# Patient Record
Sex: MALE | Race: WHITE
[De-identification: names, ages, dates, MRNs, and addresses within clinical notes are randomized per-mention and may not be internally consistent; named-entity substitution may affect disease eponyms.]

---

## 2021-01-13 ENCOUNTER — HOSPITAL ENCOUNTER (INPATIENT)
Dept: HOSPITAL 75 - IRF | Age: 51
LOS: 14 days | Discharge: HOME | DRG: 91 | End: 2021-01-27
Attending: INTERNAL MEDICINE | Admitting: INTERNAL MEDICINE
Payer: COMMERCIAL

## 2021-01-13 VITALS — SYSTOLIC BLOOD PRESSURE: 126 MMHG | DIASTOLIC BLOOD PRESSURE: 84 MMHG

## 2021-01-13 VITALS — SYSTOLIC BLOOD PRESSURE: 128 MMHG | DIASTOLIC BLOOD PRESSURE: 83 MMHG

## 2021-01-13 VITALS — BODY MASS INDEX: 25.86 KG/M2 | WEIGHT: 195.11 LBS | HEIGHT: 73.03 IN

## 2021-01-13 DIAGNOSIS — M41.9: ICD-10-CM

## 2021-01-13 DIAGNOSIS — J18.9: ICD-10-CM

## 2021-01-13 DIAGNOSIS — J40: ICD-10-CM

## 2021-01-13 DIAGNOSIS — Z79.52: ICD-10-CM

## 2021-01-13 DIAGNOSIS — G72.81: Primary | ICD-10-CM

## 2021-01-13 DIAGNOSIS — D64.9: ICD-10-CM

## 2021-01-13 DIAGNOSIS — B94.8: ICD-10-CM

## 2021-01-13 PROCEDURE — 82805 BLOOD GASES W/O2 SATURATION: CPT

## 2021-01-13 PROCEDURE — 36600 WITHDRAWAL OF ARTERIAL BLOOD: CPT

## 2021-01-13 PROCEDURE — 36415 COLL VENOUS BLD VENIPUNCTURE: CPT

## 2021-01-13 PROCEDURE — 83605 ASSAY OF LACTIC ACID: CPT

## 2021-01-13 PROCEDURE — 94640 AIRWAY INHALATION TREATMENT: CPT

## 2021-01-13 PROCEDURE — 71275 CT ANGIOGRAPHY CHEST: CPT

## 2021-01-13 PROCEDURE — 71046 X-RAY EXAM CHEST 2 VIEWS: CPT

## 2021-01-13 PROCEDURE — 84145 PROCALCITONIN (PCT): CPT

## 2021-01-13 PROCEDURE — 85025 COMPLETE CBC W/AUTO DIFF WBC: CPT

## 2021-01-13 PROCEDURE — 94760 N-INVAS EAR/PLS OXIMETRY 1: CPT

## 2021-01-13 PROCEDURE — 94664 DEMO&/EVAL PT USE INHALER: CPT

## 2021-01-13 PROCEDURE — 80053 COMPREHEN METABOLIC PANEL: CPT

## 2021-01-13 RX ADMIN — DOCUSATE SODIUM AND SENNOSIDES SCH EA: 8.6; 5 TABLET, FILM COATED ORAL at 20:36

## 2021-01-13 RX ADMIN — METOPROLOL TARTRATE SCH MG: 50 TABLET, FILM COATED ORAL at 20:33

## 2021-01-13 RX ADMIN — POLYETHYLENE GLYCOL (3350) SCH GM: 17 POWDER, FOR SOLUTION ORAL at 20:36

## 2021-01-13 RX ADMIN — DOCUSATE SODIUM SCH MG: 100 CAPSULE ORAL at 20:36

## 2021-01-13 NOTE — PM&R POST ADMISSION ASSESSMENT
PM&R HP


Date of Visit:  Jan 13, 2021


Time of Visit:  12:00


History of Present Illness


CC: Debility following Covid pneumonia





HPI: This is a 51yo  male clinic Pt of Dr. Grayson Hull who 

suffered from Covid-19 pneumonia 11/12 with acute hypoxic, hypercapneic, 

respiratory failure secondary to ARDS and pneumonia. He was placed on a 

ventilator, trach was removed, has a history of HTN, acute kidney injury, 

hemorrhage of trach stoma, hypernatremia, anemia and leukocytosis. His prior 

level of functioning was independent, working full time at the dog food 

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 He is 

currently dependent in most activities due to desaturation and severe debility. 

His plan is to go home with his significant other girlfriend.





David Platt is a previously healthy 50yoBM admitted to [facility] rehab on 1/13/21 

from [facility] and prior to that from [facility] for 

respiratory failure due to COVID-19. Pt was diagnosed with COVID-19 on 11/12/20 

and was treating himself at home when he got progressively fatigued and short of

breath which prompted him to visit the ED at [facility] on 11/23/20 

where his respiratory status worsened with O2 sats 57% on 5 L NC.  In the ED, he

was placed on 100% non-rebreather, given Levophed for hypotension, and sent to 

ICU where his breathing continued to decline.  In [facility] ICU, treatment escalated

from BiPAP with no improvement to intubation and placement on mechanical 

ventilation with sedation by Propofol and Precedex.  Pt unable to be weaned from

vent so trach and PEG placed.  To treat COVID pt received convalescent plasma, 

Remdesivir, a course of Decadron.  He also received a 10-day course of 

Vancomycin and Zosyn.  On 12/18/20 pt transferred to [facility] 

from [facility] to continue management and weaning of mechanical ventilation.  At 

[facility] patient remained on the vent and sedated for over a week and suffered a 

bleed from his tracheostomy stoma requiring transfusion of 1 unit PRBC and 

epinephrine to control.  He developed PNA due to Proteus mirabilis which was 

treated with 7 days of Zosyn.  He began weaning off sedation on 12/26/21 and 

graduated to CPAP by 12/29/21 with continued improvement culminating with trach 

capped and toleration of room air only by 1/06/21.  Patient presents today at 

[facility] with significant decline in functional ability and will require intensive 

rehabilitation to return home.


OMAR ALEJO STUDENT





Patient was admitted to the ARU during this COVID-19 emergency. The ARU is the 

best and most appropriate post-acute care setting for this patient at this 

current time. Patient meets IRF admission criteria, however will be unable to 

tolerate 3 hours of therapy/5 days per week. Provide specific plan, such as: 

This patients individualized intensive rehabilitation plan is to receive 2 

hours of therapy per day, by receiving 1 hour of PT and 1 hour of OT 5 out of 7 

days per the patients week.  As an interdisciplinary team, we will discuss this

patients ability to tolerate an increase in the intensity of therapy to be 

provided throughout the patients stay.





Past Medical-Social-Family Hx


Past Med/Social Hx:  Reviewed Nursing Past Med/Soc Hx, Reviewed and Corrections 

made


Patient Social History


Marrital Status:  cohabiting


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


Alcohol Use:  Denies Use


Smoking Status:  Never a Smoker


Recent Foreign Travel:  No


Contact w/other who traveled:  No





Past Medical History


Respiratory:  Pneumonia


COVID-19 PNA





PM&R Allergy/Meds/Data Review


Allergies


Coded Allergies:  


     No Known Drug Allergies (Unverified , 1/13/21)





Home Medications


Scheduled


Enoxaparin Sodium (Enoxaparin Sodium), 40 MG SQ DAILY, (Reported)


Famotidine (Famotidine), 20 MG PO DAILY, (Reported)


Metoprolol Tartrate (Metoprolol Tartrate), 100 MG PO BID, (Reported)


Prednisone (Prednisone), 20 MG PO DAILY, (Reported)





Scheduled PRN


Ipratropium/Albuterol Sulfate (Iprat-Albut 0.5-3(2.5) mg/3 ml), 3 ML IH Q6H PRN 

for SHORTNESS OF BREATH, (Reported)





Current Medications


Current Medications


Reviewed





Review of Systems


Constitutional:  see HPI, malaise, weakness


EENTM:  no symptoms reported


Respiratory:  cough, dyspnea on exertion, short of breath, wheezing


Cardiovascular:  no symptoms reported


Gastrointestinal:  no symptoms reported


Genitourinary:  no symptoms reported


Musculoskeletal:  back pain


Skin:  no symptoms reported


Psychiatric/Neurological:  No Symptoms Reported





Physical Exam


Physical Exam


Vital Signs





Vital Signs - First Documented








 1/13/21





 10:43


 


Temp 36.6


 


Pulse 98


 


Resp 18


 


B/P (MAP) 128/83 (98)


 


Pulse Ox 92


 


O2 Delivery Room Air





Capillary Refill :


Height, Weight, BMI


Height: '"


Weight: lbs. oz. kg;  BMI


Method:


General Appearance:  No Apparent Distress, WD/WN, Anxious, Thin


Eyes:  Bilateral Eye Normal Inspection, Bilateral Eye PERRL


HEENT:  PERRL/EOMI, Normal ENT Inspection, Pharynx Normal


Neck:  Full Range of Motion, Normal Inspection, Non Tender, Supple, Carotid 

Bruit


Respiratory:  Chest Non Tender, Lungs Clear, No Accessory Muscle Use, No 

Respiratory Distress, Decreased Breath Sounds


Cardiovascular:  Regular Rate, Rhythm, No Edema, No Gallop, No JVD, No Murmur, 

Normal Peripheral Pulses


Gastrointestinal:  Normal Bowel Sounds, No Organomegaly, No Pulsatile Mass, Non 

Tender, Soft


Back:  Normal Inspection, No CVA Tenderness, No Vertebral Tenderness


Extremity:  Normal Capillary Refill, Normal Inspection, Normal Range of Motion, 

Non Tender, No Calf Tenderness, No Pedal Edema


Neurologic/Psychiatric:  Alert, Oriented x3, No Motor/Sensory Deficits, Abnormal

Gait, Depressed Affect, Motor Weakness (generalized all extremities)


Skin:  Normal Color, Warm/Dry


Lymphatic:  No Adenopathy





PM&R Medical Assessment & Plan


REHAB/MEDICAL ASSESSMENT AND PLAN:





REHAB IMPAIRMENT GROUP: 


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





ETIOLOGIC DIAGNOSIS: 


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





The comorbidities that impact the patients function and/or functional outcome 

by:frail status, poor reserve, catastrophic lung dysfunction





REHAB PLAN:


The patient is being admitted to our comprehensive inpatient rehabilitation 

facility and can tolerate the intensity of service consisting of at least:


      180 minutes of therapy a day, 5 out of 7 days a week 


    


Rehab treatment will consist of:  PT OT will focus on regaining strength and 

ambulatory skills along with increase in ADL independence in order to return to 

live independently





The patient/family has a good understanding of our discharge process and will 

benefit from an interdisciplinary inpatient rehabilitation program. The patient 

has potential to make improvement and is in need of at least two of the 

following multidisciplinary therapies including but not limited to physical, 

occupational, speech, and prosthetics and orthotics.  Additionally the patient 

will need services from respiratory, nutritional services, wound care, 

psychology, etc. (Customize this to each patient). Given the patients complex 

condition and risk of further medical complications, rehabilitation services 

cannot be safely or effectively provided at a lower level of care such as a 

skilled nursing facility.





BARRIERS TO DISCHARGE:


Severe myopathy from COVID-19





ESTIMATED LOS:


10 days





DISPOSITION:


Home





RELEVANT CHANGES SINCE PREADMISSION SCREENING: 


I have compared the patients medical and functional status at the time of the 

preadmission screening and there are: 


      no changes 





PROGNOSIS:


Good





REHABILITATION GOALS:  


1. PT OT will focus on regaining strength and ambulatory skills along with 

increase in ADL independence in order to return to live independently








All the above goals were reviewed with the patient and he/she is in agreement.


By signing this document, I acknowledge that I have personally performed a full 

physical examination on this patient within 24 hours of admission to this 

inpatient rehabilitation facility and have determined the patient to be able to 

tolerate the above course of treatment at an intensive level for a reasonable 

period of time. I will be completing a detailed individualized Plan of Care for 

this patient by day #4 of the patients stay based upon the Preadmission Screen,

the Post-Admission Evaluation, and the therapy evaluations.


Admission Dx/Comorbidities:  


(1) Myopathy


ICD Codes:  G72.9 - Myopathy, unspecified


(2) History of tracheostomy


ICD Codes:  Z98.890 - Other specified postprocedural states


(3) Frailty


ICD Codes:  R54 - Age-related physical debility


(4) Cough


ICD Codes:  R05 - Cough


(5) Anemia


ICD Codes:  D64.9 - Anemia, unspecified


(6) COVID-19


ICD Codes:  U07.1 - COVID-19





Assessment/Plan


Assessment and Plan


Assess & Plan/Chief Complaint


Assessment:


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


Anemia


s/p trach now DC


Steroid maintenance





Plan:


IRF protocol


Increase ADL independence


Ambulate


O2 monitoring











THOR MATHEWS DO                Jan 13, 2021 11:06

## 2021-01-13 NOTE — PROGRESS NOTE
OMAR ALEJO MED STUDENT 1/13/21 1155:


Progress Note


David Platt is a previously healthy 50yoBM admitted to [facility] rehab on 1/13/21 

from [facility] and prior to that from [facility] for 

respiratory failure due to COVID-19. Pt was diagnosed with COVID-19 on 11/12/20 

and was treating himself at home when he got progressively fatigued and short of

breath which prompted him to visit the ED at [facility] on 11/23/20 

where his respiratory status worsened with O2 sats 57% on 5 L NC.  In the ED, he

was placed on 100% non-rebreather, given Levophed for hypotension, and sent to 

ICU where his breathing continued to decline.  In [facility] ICU, treatment escalated

from BiPAP with no improvement to intubation and placement on mechanical 

ventilation with sedation by Propofol and Precedex.  Pt unable to be weaned from

vent so trach and PEG placed.  To treat COVID pt received convalescent plasma, 

Remdesivir, a course of Decadron.  He also received a 10-day course of 

Vancomysin and Zosyn.  On 12/18/20 pt transferred to [facility] 

from [facility] to continue management and weaning of mechanical ventilation.  At 

[facility] patient remained on the vent and sedated for over a week and suffered a 

bleed from his tracheostomy stoma requiring transfusion of 1 unit PRBC and 

epinephrine to control.  He developed PNA due to Proteus mirabilis which was 

treated with 7 days of Zosyn.  He began weaning off sedation on 12/26/21 and 

graduated to CPAP by 12/29/21 with continued improvement culminating with trach 

capped and toleration of room air only by 1/06/21.  Patient presents today at 

[facility] with significant decline in functional ability and will require intensive 

rehabilitation to return home.





NEDRA MATHEWS DO 1/14/21 0607:


Supervisory-Addendum Brief


Verification & Attestation


Participated in pt care:  history, MDM, physical


Personally performed:  exam, history, MDM, supervision of care


Care discussed with:  Medical Student


Procedures:  n/a


Results interpretation:  Verified all documentation


Verification and Attestation of Medical Student E/M Service





A medical student performed and documented this service in my presence. I 

reviewed and verified all information documented by the medical student and made

modifications to such information, when appropriate. I personally performed the 

physical exam and medical decision making. 





 Nedra Mathews, Jan 14, 2021,06:07











OMAR ALEJO MED STUDENT  Jan 13, 2021 11:55


NEDRA MATHEWS DO                Jan 14, 2021 06:07

## 2021-01-13 NOTE — OCCUPATIONAL THERAPY EVAL
OT Evaluation-General/PLF


Medical Diagnosis


Admission Date


2021 at 10:19


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


Onset Date:  2020





Therapy Diagnosis


Therapy Diagnosis:  Weakness, Decreased ADL skills





Precautions


Precautions/Isolations:  Fall Prevention, Standard Precautions, Pressure Ulcer





Weight Bear Status


Weight Bearing Restriction:  Weight Bearing/Tolerated





Referral


Physician:  Marlon Tobias Reason:  Activity Tolerance, Self Care, Evaluation/Treatment, 

Strengthening/ROM





Medical History


Pertinent Medical History:  HTN


Additional Medical History


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


Current History


Pt. admitted on 20 with respiratory failure.  Diagnosed with Covid on 

20.  Intubated and trach/PEG tube placed.


Reviewed History:  Yes





Social History


Home:  Single Level


Current Living Status:  Significant Other


Entry Into Home:  Stairs Without Railing


 Steps Into Home:  3





ADL-Prior Level of Function


SCALE: Activities may be completed with or without assistive devices.





6-Indepedent-patient completes the activity by him/herself with no assistance 

from a helper.


5-Set-up or Clean-up Assistance-helper sets up or cleans up; patient completes 

activity. [place] assists only prior to or  


    following the activity.


4-Supervision or Touching Assistance-helper provides verbal cues and/or touching

/steadying and/or contact guard assistance as patient completes activity. 

Assistance may be provided   


    throughout the activity or intermittently.


3-Partial/Moderate Assistance-helper does LESS THAN HALF the effort. [place] 

lifts, holds or supports trunk or limbs, but provides less than half the effort.


2-Substantial/Maximal Assistance-helper does MORE THAN HALF the effort. [place] 

lifts or holds trunk or limbs and provides more than half the effort.


9-Bbkvesouo-czjhka does ALL the effort. Patient does none of the effort to 

complete the activity. Or, the assistance of 2 or more helpers is required for 

the patient to complete the  


    activity.


If activity was not attempted, code reason:


7-Patient Refused.


9-Not Applicable-not attempted and the patient did not perform the activity 

before the current illness, exacerbation or injury.


10-Not Attempted due to Environmental Limitations-(lack of equipment, weather 

restraints, etc.).


88-Not Attempted due to Medical Conditions or Safety Concerns.


ADL PLOF Comments


Pt. was independent with all daily skills.  He does not use an assistive device,

and works full time.  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


Self Care:  Independent


Functional Cognition:  Independent


DME/Equipment:  Shower


Drive Self:  Yes





OT Current Status


Subjective


No pain reported.  Pt. does report that he feels very fatigued.





Mental Status/Objective


Patient Orientation:  Person, Place, Time, Situation


Attachments:  PEG Tube





Current


Hand Dominance:  Right


Upper Extremity ROM


Pt. has limited shoulder AROM.  He is able to flex bilateral shoulders to 

approximately 30 degrees.  WFL in all other joints.


Upper Extremity Strength


2+/5 bilateral elbows flexion, approximately 3/5 bilateral hands.


Pt. verbalizes that he has tremors in bilateral hands.  He states that he had 

them mildly before becoming sick, but they are more pronounced now after being 

ill.





ADL-Treatment


Eating (QC):  4 (Pt. issued weighted utensils to assist with tremors when 

feeding self.)


Oral Hygiene (QC):  7


Shower/Bathe Self (QC):  88


Upper Body Dressing (QC):  2 (Pt. reports that today was the first day he has 

had street clothing on.  Nursing at previous location donned shirt for him.)


Lower Body Dressing (QC):  2


On/Off Footwear (QC):  2


Toileting Hygiene (QC):  2 (Pt. reports that he requires max assist for 

toileting tasks.)





Other Treatments


Pt. admitted this date via EMS.  Pt. seen for co-treatment by PT/OT due to need 

of skilled assistance x 2 for weakness, severe debility, and skill acquisition. 

OT assessed ADL skills and UE strength while PT focused on LE strengthening and 

transfers/mobility.  Pt. requires mod assist for supine-sit, and min x 2 for 

sit-stand.  Pt. had on street clothing, and reports that the nursing staff 

assisted him prior to discharge from acute facility.  Pt. verbalizes that he 

just started to feed self several days ago, as he required assistance due to 

weakness and tremors.  OT issues pt. hand therapy sponge for continued 

strengthening, as well as heavy utensils.  Pt. requests diet sprite.  OT obtains

this for him, and pt. is unable to open the bottle on his own.  Pt. able to sit 

on side of bed with appropriate posture, but has difficulty keeping head upright

due to weakness.  Pt. is able to stand several more times at walker with min 

assistance x 2.  He is unable to stand for very long, (Please see PT note).  Pt.

transfers sit-supine with mod assistance.  All needs are met at bed level.





Education


OT Patient Education:  Correct positioning, Exercise program, Modified ADL 

techniques, Progress toward Goal/Update tx plan, Purpose of tx/functional 

activities, Reviewed precautions, Rehab process, Transfer techniques


Teaching Recipient:  Patient


Teaching Methods:  Demonstration, Discussion


Response to Teaching:  Verbalize Understanding, Return Demonstration





OT Short Term Goals


Short Term Goals


Time Frame:  2021


Eatin


Oral hygiene:  5


Toileting hygiene:  4


Shower/bathe self:  4


Upper body dressin


Lower body dressin


Putting on/taking off footwear:  4





OT Long Term Goals


Long Term Goals


Time Frame:  Feb 10, 2021


Eating (QC):  6


Oral Hygiene (QC):  6


Toileting Hygiene (QC):  6


Shower/Bathe Self (QC):  4


Upper Body Dressing (QC):  6


Lower Body Dressing (QC):  6


On/Off Footwear (QC):  6


Additional Goals:  1-Demonstrate ADL Tasks, 2-Verbalize Understanding, 3-

ImproveStrength/[place]


1=Demonstrate adherence to instructed precautions during ADL tasks.


2=Patient will verbalize/demonstrate understanding of assistive 

devices/modifications for ADL.


3=Patient will improve strength/tolerance for activity to enable patient to 

perform ADL's.





OT Education/Plan


Problem List/Assessment


Assessment:  Decreased Activ Tolerance, Decreased UE Strength, Dependent 

Transfers, Impaired Bed Mobility, Impaired Funct Balance, Impaired I ADL's, 

Impaired Self-Care Skills, Restricted Funct UE ROM





Discharge Recommendations


Plan/Recommendations:  Continue POC


Therapy Discharge Recommendati:  Home & Family, Post Acute OT





Treatment Plan/Plan of Care


Treatment,Training & Education:  Yes


Patient would benefit from OT for education, treatment and training to promote 

independence in ADL's, mobility, safety and/or upper extremity function for 

ADL's.


Plan of Care:  ADL Retraining, Caregiver Training, Concurrent Therapy, Fun

ctional Mobility, Group Exercise/Act as Ind, UE Funct Exercise/Act, UE Neuromus 

Re-Ed/Coord


Treatment Duration:  2021


Frequency:  Modified Program (IRF)


Estimated Hrs Per Day:  1 hour per day


Agreement:  Yes


Rehab Potential:  Good


Due to a COVID-19 viral infection, the patient has deficits that warrant 

inpatient Acute Rehab.  The patient will clearly benefit from intensive PT and 

OT, however, due to patient's observed endurance and therapy considerations, he 

may not be able to tolerate the full 3 hours of scheduled therapy.  Therefore, 

he will be scheduled for as much therapy as he can tolerate with intentional 

rest breaks, shortened sessions, including providing therapy across 6 to 7 days.

 As the patient tolerates, the intensity, frequency, and duration of his therapy

program will be increased.





Time/GCodes


Start Time:  10:20


Stop Time:  11:15


Total Time Billed (hr/min):  45


Billed Treatment Time


9457-7199 PT eval, no charge


[id number] 1, EVH x 10minutes


[id number] FA x 35minutes.  Co-treatment with PT.  Please see above note for 

designated roles.











DILLON MANCUSO OT           2021 15:46

## 2021-01-13 NOTE — NUR
MANJIT ROBERTSON admitted to room 233, with an admitting diagnosis of CRITICAL ILLNESS MYOPATHY 
S/P COVID-19, on 1/13/20 from [facility] via EMS, accompanied by EMS STAFF. 
MANJIT ROBERTSON introduced to surroundings, call light, bed controls, phone, TV, temperature 
control, lights, meal times, smoking policy, visitor policy, side rail policy, bathrooms and 
showers. Patient Rights given to patient in the handbook. MANJIT ROBERTSON verbalizes 
understanding that [facility] is not responsible for the loss or damage to any personal 
effects or valuables that are kept in the patient's possession during their hospitalization. 
The following Patient Care Plans were discussed with the PATIENT: Discharge Planning, 
IMPAIRED MOBILITY, HIGH RISK: IMPAIRED SKIN INTEGRITY, HIGH RISK: INJURY, and KNOWLEDGE 
DEFICIT. MANJIT ROBERTSON verbalizes understanding of Interdisciplinary Patient Education. 
Patient received Patient Rights Booklet, which includes Privacy Act Statement and Data 
Collection Information Summary.

## 2021-01-13 NOTE — NUR
MED REC WAS ENTERED USING THE DISCHARGE ORDER FROM [facility]

-------------------------------------------------------------------------------

Addendum: 01/14/21 at 1340 by OLI GORE CP

-------------------------------------------------------------------------------

SPOKE WITH THE PT TO COMPLETE THE MED REC



ACCORDING TO THE PT HE DOES NOT TAKE ANY PRESCRIPTION OR OTC MEDS

## 2021-01-13 NOTE — PHYSICAL THERAPY DAILY NOTE
PT Daily Note-Current


Subjective


Pt laying Supine in bed after finishing with OT upon arrival.  Pt agrees to PT.





Pain





   Location Body Site:  Abdomen


   Pain Description:  Ache





Mental Status


Patient Orientation:  Person, Place, Situation


Attachments:  PEG Tube





Transfers


SCALE: Activities may be completed with or without assistive devices.





6-Indepedent-patient completes the activity by him/herself with no assistance 

from a helper.


5-Set-up or Clean-up Assistance-helper sets up or cleans up; patient completes 

activity. [place] assists only prior to or  


    following the activity.


4-Supervision or Touching Assistance-helper provides verbal cues and/or 

touching/steadying and/or contact guard assistance as patient completes 

activity. Assistance may be provided   


    throughout the activity or intermittently.


3-Partial/Moderate Assistance-helper does LESS THAN HALF the effort. [place] 

lifts, holds or supports trunk or limbs, but provides less than half the effort.


2-Substantial/Maximal Assistance-helper does MORE THAN HALF the effort. [place] 

lifts or holds trunk or limbs and provides more than half the effort.


9-Mrozeiurz-lvobka does ALL the effort. Patient does none of the effort to 

complete the activity. Or, the assistance of 2 or more helpers is required for 

the patient to complete the  


    activity.


If activity was not attempted, code reason:


7-Patient Refused.


9-Not Applicable-not attempted and the patient did not perform the activity 

before the current illness, exacerbation or injury.


10-Not Attempted due to Environmental Limitations-(lack of equipment, weather 

restraints, etc.).


88-Not Attempted due to Medical Conditions or Safety Concerns.





Weight Bearing


Right Lower Extremity:  Right


Full Weight Bearing


Left Lower Extremity:  Left


Full Weight Bearing





Exercises


Supine Ex:  Ankle pumps, Quad Set, Glut sets, Heel Slides, Straight leg raise, 

Hip abd/add


Supine Reps:  15





Treatments


Pt completes Supine Ex with RB as needed.  Pt resting Supine in bed as ST a

rrives.  All needs met, call light in hand.





Assessment


Current Status:  Good Progress


Pt is motivated and continues to push self throughout tx.





PT Short Term Goals


Short Term Goals


Time Frame:  2021


Roll Left & Right:  6


Sit to lyin


Lying to sitting on side of be:  6


Sit to stand:  6


Chair/bed-to-chair transfer:  6





PT Long Term Goals


Long Term Goals


PT Long Term Goals Time Frame:  Feb 3, 2021


Roll Left & Right (QC):  6


Sit to Lying (QC):  6


Lying-Sitting on Side/Bed(QC):  6


Sit to Stand (QC):  6


Chair/Bed-to-Chair Xfer(QC):  6


Toilet Transfer (QC):  6


Car Transfer (QC):  5


Does the Patient Walk:  No and Walking Goal IS indicated


Walk 10 feet (QC):  6


Walk 50ft with 2 Turns (QC):  6


Walk 150 ft (QC):  6


Walking 10ft on Uneven Surface:  6


1 Step (curb) (QC):  6


4 Steps (QC):  5


12 Steps (QC):  5


Picking up an Object (QC):  6


Does the Pt use WC or Scooter?:  No


Wheel 50 feet with 2 turns (QC:  9


Type:  N/A


Wheel 150 feet:  9


Type:  N/A





PT Plan


Problem List


Problem List:  Activity Tolerance, Functional Strength





Treatment/Plan


Treatment Plan:  Continue Plan of Care


Treatment Plan:  Bed Mobility, Education, Functional Activity [place], Functional 

Strength, Group Therapy, Gait, Safety, Therapeutic Exercise, Transfers


Treatment Duration:  Feb 3, 2021


Frequency:  Modified Program (IRF)


Estimated Hrs Per Day:  Other


Patient and/or Family Agrees t:  Yes





Safety Risks/Education


Patient Education:  Correct Positioning, Safety Issues


Teaching Recipient:  Patient


Teaching Methods:  Discussion


Response to Teaching:  Verbalize Understanding





Time/GCodes


Time In:  1420


Time Out:  1450


Total Billed Treatment Time:  30


Total Billed Treatment


1, EX x2 (30m)











YAEL GALVAN PTA              2021 15:38

## 2021-01-13 NOTE — ST COGNITIVE LINGUISTIC EVAL
Speech Evaluation-General


Medical Diagnosis


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


Onset Date:  Nov 12, 2020





Therapy Diagnosis


Therapy Diagnosis:  Cognitive-communication





Medical History


Pertinent Medical History:  HTN


Reviewed History:  Yes





Social History


Current Living Status:  Significant Other





Speech PLF-Current Status


Prior Level of Function





Patient lived in the home with his significant other. Patient was working


full time prior to his illness.





Language Eval: Auditory


Comprehends Simple Yes/No Ques:  Functional


Indent/Objects Multiple Fields:  Functional


Ident/Pics in Multiple Fields:  Functional


Follows 1-Step Commands:  Functional


Follows Complex Directions:  Functional


Follows General Conversations:  Functional





Language Eval: Verbal Language


Completes Spontaneous Greeting:  Functional


Produces Auto, Serial Info:  Functional


Imitates Simple Words/Phrases:  Functional


Word Finding:  Functional


Requests Basic Needs:  Functional


States Basic Personal Info:  Functional


Expresses Complex Ideas:  Functional





Objective Cognitive Domain


Attention:  WNL


Memory:  WNL


Problem Solving:  Functional


Executive Functions:  WNL


Visuospatial Skills:  WNL


Composite Severity Rating:  WNL


Clock Drawing Severity Rating:  WNL





Objective


Formal/Standardized Tests


[facility] Mental Status ([facility])


Results


28/30, within normal limits of function


Oral Motor/Speech Production


Within Normal Limits


Impression


Patient is a 51 y/o male who was admitted to the ARU s/p COVID. Patient was 

given the SLUMS at bedside with a score of 28/30 obtained. This score is within 

normal limits and does not warrant further ST services.





Speech Patient Assess


Expression of Ideas/Wants:  Expression (4)


Understanding Verbal Content:  Understands (4)


Brief Interview-Mental Status:  Yes


Repetition of Three Words:  Three (3)


Temporal Orientation: Year:  Correct (3)


Temporal Orientation: Month:  Accurate within 5 days(2)


Temporal Orientation: Day:  Correct (1)


Recall : Wear to say "Sock":  Yes, no cue required (2)


Recall : Color:  Yes, no cue required (2)


Recall : Bed:  Yes,after cueing (1)


Memory/Recall Ability:  Current season, That he or she is in a hsp/hsp unit





Speech-Plan


Patient/Family Goals


Patient/Family Goals:  


Patient plans on returning to his home upon discharge from ARU.





Treatment Plan


Speech Therapy Treatment Plan:  Discontinue ST


Treatment Duration:  Jan 13, 2021


Frequency:  1 time per week


Estimated Hrs Per Day:  .25 hour per day


Rehab Potential:  Good


Barriers to Learning:  


None identified


Pt/Family Agrees to Plan:  Yes





Safety Risks/Education


Teaching Recipient:  Patient


Teaching Methods:  Discussion


Response to Teaching:  Verbalize Understanding


Education Topics Provided:  


Safety within his room, communication of wants/needs





Time


Speech Therapy Time In:  14:50


Speech Therapy Time Out:  15:05


Total Billed Time:  15


Billed Treatment Time


1, SPSNDCOMP


ANGELICA Rios            Jan 13, 2021 14:59

## 2021-01-13 NOTE — PHYSICAL THERAPY EVALUATION
PT Evaluation-General


Medical Diagnosis


Admission Date


2021 at 10:19


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


Onset Date:  2020





Therapy Diagnosis


Therapy Diagnosis:  debility





Precautions


Precautions/Isolations:  Fall Prevention, Standard Precautions, Pressure Ulcer





Weight Bear Status


Right Lower Extremity:  Right


Full Weight Bearing


Left Lower Extremity:  Left


Full Weight Bearing





Referral


Physician:  Marlon


Reason for Referral:  Evaluation/Treatment





Medical History


Pertinent Medical History:  HTN


Current History


Pt was diagnosed with Covid 19 on 20. He presented to the hospital for 

admission on 20 due to respiratory failure. Sats were noted to be 57% on 

5L in the ER. Eventually intubated and placed on ventilator. Initially unable to

be weaned from vent, had trach and PEG tube placement. Pt has since weaned to 

RA, transferred to ARU this date from [facility].


Reviewed History:  Yes





Social History


Home:  Single Level


Current Living Status:  Significant Other


Entry Into Home:  Stairs Without Railing


PT Steps Into Home:  3





Prior


Prior Level of Function


SCALE: Activities may be completed with or without assistive devices.





6-Indepedent-patient completes the activity by him/herself with no assistance 

from a helper.


5-Set-up or Clean-up Assistance-helper sets up or cleans up; patient completes 

activity. [place] assists only prior to or  


    following the activity.


4-Supervision or Touching Assistance-helper provides verbal cues and/or 

touching/steadying and/or contact guard assistance as patient completes activit

y. Assistance may be provided   


    throughout the activity or intermittently.


3-Partial/Moderate Assistance-helper does LESS THAN HALF the effort. [place] 

lifts, holds or supports trunk or limbs, but provides less than half the effort.


2-Substantial/Maximal Assistance-helper does MORE THAN HALF the effort. [place] 

lifts or holds trunk or limbs and provides more than half the effort.


3-[place]-[id number] does ALL the effort. Patient does none of the effort to 

complete the activity. Or, the assistance of 2 or more helpers is required for 

the patient to complete the  


    activity.


If activity was not attempted, code reason:


7-Patient Refused.


9-Not Applicable-not attempted and the patient did not perform the activity 

before the current illness, exacerbation or injury.


10-Not Attempted due to Environmental Limitations-(lack of equipment, weather 

restraints, etc.).


88-Not Attempted due to Medical Conditions or Safety Concerns.


Bed Mobility:  6


Transfers (B,C,W/C):  6


Gait:  6


Stairs:  6


Wheelchair Mobility:  9


Indoor Mobility (Ambulation):  Independent


Stairs:  Independent


Prior Devices Use:  None


Working full time prior to illness





PT Evaluation-Current


Subjective


Pt presented from LTACH via EMS. Denies pain but reports fatigue.





Pain





   Numeric Pain Scale:  0-No Pain





Pt/Family Goals


Home





Objective


Patient Orientation:  Person, Place, Time, Situation





ROM/Strength


ROM Upper Extremities


See OT


ROM Lower Extremities


Grossly WFL for functional mobility


Strength Upper Extremities


See OT


Strength Lower Extremities


(B) DF grossly 3+/5, PF 2/5


(B) knee flexion and extension: 3-/5


(B) hips grossly: 3-/5





Integumentary/Posture


Integumentary


See nurses' notes


Posture


Pt very weak, sits EOB with posterior pelvic tilt, flexed posture. Difficulty 

maintaining upright posture over time, frequently hanging his head.





Neuromuscular


(Tone, Coordination, Reflexes)


Tremors with exertion in (B) UE and LE





Sensory


Vision:  Functional


Hearing:  Functional


Hand Dominance:  Right


Sensation Right Lower Extremit:  Intact


Sensation Left Lower Extremity:  Intact





Transfers


Roll Left & Right (QC):  6


Sit to Lying (QC):  3


Lying to Sitting/Side of Bed(Q:  3


Sit to Stand (QC):  1


Chair/Bed-to-Chair Xfer(QC):  88


Toilet Transfer (QC):  88


Car Transfer (QC):  88


Pt sat EOB x 30' with VCS for posture. Good sitting balance but demonstrates 

postural fatigue. Pt only able to maintain standing at FWW for roughly 15" 

before needing to sit due to LE fatigue.





Gait


Does the Patient Walk?:  No and Walking Goal IS indicated


Mode of Locomotion:  Walk


Anticipated Mode of Locomotion:  Walk


Walk 10 feet (QC):  88


Walk 50 ft with 2 Turns(QC):  88


Walk 150 ft (QC):  88


Walking 10ft/uneven surface-QC:  88


Gait Assistive Device:  FWW


Comments/Gait Description


Pt only able to stand roughly 15" before returning to sit due to poor functional

activity tolerance.





Wheelchair Training


Does the Pt Use a Wheelchair?:  No


Wheel 50 ft with 2 turns (QC):  9


Wheel 150 ft (QC):  9


Type of Wheelchair:  N/A





Stairs


1 Step (curb) (QC):  88


4 Steps (QC):  88


12 Steps (QC):  88


Pt with poor functional activity tolerance; only able to tolerate static 

standing for roughly 15" this date.





Balance


Sitting Static:  Good


Sitting Dynamic:  Good


Standing Static:  Fair


 Standing Dynamic:  Fair


Picking up an Object (QC):  88





Treatment


Eval. Sitting EOB with VCS for posture, reviewed supine postural exercises. At 

EOB with OT at end of treatment.





Assessment/Needs


Pt is a 50 y.o. male with debility s/p Covid 19 hospitalization. Pt would 

benefit from skilled PT to improve functional activity tolerance, functional 

strength and (I) with functional mobility to allow safe return home with S.O. 

with decreased caregiver burden.


Rehab Potential:  Good





PT Short Term Goals


Short Term Goals


Time Frame:  2021


Roll Left & Right:  6


Sit to lyin


Lying to sitting on side of be:  6


Sit to stand:  6


Chair/bed-to-chair transfer:  6





PT Long Term Goals


Long Term Goals


PT Long Term Goals Time Frame:  Feb 3, 2021


Roll Left & Right (QC):  6


Sit to Lying (QC):  6


Lying-Sitting on Side/Bed(QC):  6


Sit to Stand (QC):  6


Chair/Bed-to-Chair Xfer(QC):  6


Toilet Transfer (QC):  6


Car Transfer (QC):  5


Does the Patient Walk:  No and Walking Goal IS indicated


Walk 10 feet (QC):  6


Walk 50ft with 2 Turns (QC):  6


Walk 150 ft (QC):  6


Walking 10ft on Uneven Surface:  6


1 Step (curb) (QC):  6


4 Steps (QC):  5


12 Steps (QC):  5


Picking up an Object (QC):  6


Does the Pt use WC or Scooter?:  No


Wheel 50 feet with 2 turns (QC:  9


Type:  N/A


Wheel 150 feet:  9


Type:  N/A


PT LTGs established to allow safe return home with S.O. with decreased caregiver

burden.





PT Plan


Problem List


Problem List:  Activity Tolerance, Functional Strength, Safety, Balance, Gait, 

Transfer, Bed Mobility





Treatment/Plan


Treatment Plan:  Continue Plan of Care


Treatment Plan:  Bed Mobility, Education, Functional Activity [place], Functional 

Strength, Group Therapy, Gait, Safety, Therapeutic Exercise, Transfers


Treatment Duration:  Feb 3, 2021


Frequency:  Modified Program (IRF)


Estimated Hrs Per Day:  Other


Patient and/or Family Agrees t:  Yes


Due to a COVID-19 viral infection, the patient has deficits that warrant inpatie

nt Acute Rehab. The patient will clearly benefit from intensive PT and OT, 

however, due to patient's observed endurance and therapy considerations, he may 

not be able to tolerate the full 3 hours of scheduled therapy. Therefore, he 

will be scheduled for as much therapy as he can tolerate with intentional rest 

breaks, shortened sessions, including providing therapy across 6 to 7 days. As 

the patient tolerates, the intensity, frequency, and duration of his therapy 

program will be increased.





Safety Risks/Education


Teaching Recipient:  Patient


Teaching Methods:  Discussion


Response to Teaching:  Verbalize Understanding


Role of PT, POC





Discharge Recommendations


Barriers to Progress


poor functional activity tolerance





Time/GCodes


Time In:  1020


Time Out:  1100


Total Billed Treatment Time:  30


Total Billed Treatment


1, EVHIGH x 10', FA x 20'


[id number] EVHIGH


[id number] OT eval


[id number] Co-treat with OT


Co-treat with OT due level of assist required and poor functional activity 

tolerance. PT addressing posture, LE's and transfers; OT addressing UE and ADLs.











SANDRA OSULLIVAN DPT              2021 14:33
Hyperbilirubinemia of prematurity
Hyperbilirubinemia of prematurity

## 2021-01-13 NOTE — OCCUPATIONAL THER DAILY NOTE
OT Current Status-Daily Note


Subjective


Pt AxO, does not rate pain. Pleasant throughout. Pt agrees to OT tx, though 

denies OOB.





Mental Status/Objective


Patient Orientation:  Person, Place, Situation





ADL-Treatment


Therapy Code Descriptions/Definitions 





Functional [place] Measure:


0=Not Assessed/NA        4=Minimal Assistance


1=Total Assistance        5=Supervision or Setup


2=Maximal Assistance  6=Modified [place]


3=Moderate Assistance 7=Complete IndependenceSCALE: Activities may be completed 

with or without assistive devices.





6-Indepedent-patient completes the activity by him/herself with no assistance 

from a helper.


5-Set-up or Clean-up Assistance-helper sets up or cleans up; patient completes 

activity. [place] assists only prior to or  


    following the activity.


4-Supervision or Touching Assistance-helper provides verbal cues and/or touch

ing/steadying and/or contact guard assistance as patient completes activity. 

Assistance may be provided   


    throughout the activity or intermittently.


3-Partial/Moderate Assistance-helper does LESS THAN HALF the effort. [place] 

lifts, holds or supports trunk or limbs, but provides less than half the effort.


2-Substantial/Maximal Assistance-helper does MORE THAN HALF the effort. [place] 

lifts or holds trunk or limbs and provides more than half the effort.


1-Ozckdvrhh-snjprq does ALL the effort. Patient does none of the effort to 

complete the activity. Or, the assistance of 2 or more helpers is required for 

the patient to complete the  


    activity.


If activity was not attempted, code reason:


7-Patient Refused.


9-Not Applicable-not attempted and the patient did not perform the activity 

before the current illness, exacerbation or injury.


10-Not Attempted due to Environmental Limitations-(lack of equipment, weather 

restraints, etc.).


88-Not Attempted due to Medical Conditions or Safety Concerns.





Other Treatment


Pt agrees to bed ex. HEP given and each activity demonstrated/ minimal cues for 

proper placement. Pt able to complete the following ex (10-15 reps) with 

resistance band with RUE: bicep curls, shoulder external/ internal rotation. Com

pletes the following against gravity with RUE: shoulder flexion to 90*. LUE 

completes the following against gravity with noted shaking during full motion: 

shoulder flexion with assist for reaching 90*, able to stabilize in this 

position 3-4 seconds, x 5 trials. Pt able to utilize back/ triceps to push LUE 

into bedding on way down. Pt completes the following with theraband LUE: bicep 

curls, shoulder internal/ external rotation. Skilled modifications of activity 

per pt tolerance. Pt's LUE ranged to full range in shoulder flexion and 

abduction. 


All needs met, call light in reach, pt left with PT end of session.





Education


OT Patient Education:  Exercise program, Home exercise program, Purpose of 

tx/functional activities, Safety issues


Teaching Recipient:  Patient


Teaching Methods:  Demonstration, Handout, Discussion


Response to Teaching:  Verbalize Understanding, Return Demonstration, 

Reinforcement Needed





OT Long Term Goals


Long Term Goals


Eating (QC):  6


Oral Hygiene (QC):  6


Toileting Hygiene (QC):  6


Shower/Bathe Self (QC):  6


Upper Body Dressing (QC):  6


Lower Body Dressing (QC):  6


On/Off Footwear (QC):  6


1=Demonstrate adherence to instructed precautions during ADL tasks.


2=Patient will verbalize/demonstrate understanding of assistive devices/mod

ifications for ADL.


3=Patient will improve strength/tolerance for activity to enable patient to 

perform ADL's.





OT Education/Plan


Problem List/Assessment


Assessment:  Decreased Activ Tolerance, Decreased UE Strength, Dependent 

Transfers, Edema, Impaired Bed Mobility, Impaired Funct Balance, Impaired I 

ADL's, Impaired Self-Care Skills, Restricted Funct UE ROM





Discharge Recommendations


Plan/Recommendations:  Continue POC


Therapy Discharge Recommendati:  Home & Family, Post Acute OT





Treatment Plan/Plan of Care


Treatment,Training & Education:  Yes


Patient would benefit from OT for education, treatment and training to promote 

independence in ADL's, mobility, safety and/or upper extremity function for 

ADL's.


Plan of Care:  ADL Retraining, Caregiver Training, Concurrent Therapy, 

Functional Mobility, Group Exercise/Act as Ind, UE Funct Exercise/Act, UE 

Neuromus Re-Ed/Coord


Treatment Duration:  Jan 27, 2021


Frequency:  At least 5 of 7 days/Wk (IRF)


Estimated Hrs Per Day:  1.5 hours per day


Agreement:  Yes


Rehab Potential:  Fair





Time/GCodes


Start Time:  13:55


Stop Time:  14:15


Total Time Billed (hr/min):  20


Billed Treatment Time


1, EX (20)











WILFRID FRAZIER OTPEDRITO                Jan 13, 2021 14:20

## 2021-01-14 VITALS — DIASTOLIC BLOOD PRESSURE: 71 MMHG | SYSTOLIC BLOOD PRESSURE: 119 MMHG

## 2021-01-14 VITALS — DIASTOLIC BLOOD PRESSURE: 80 MMHG | SYSTOLIC BLOOD PRESSURE: 120 MMHG

## 2021-01-14 VITALS — DIASTOLIC BLOOD PRESSURE: 83 MMHG | SYSTOLIC BLOOD PRESSURE: 127 MMHG

## 2021-01-14 LAB
ALBUMIN SERPL-MCNC: 3.2 GM/DL (ref 3.2–4.5)
ALP SERPL-CCNC: 97 U/L (ref 40–136)
ALT SERPL-CCNC: 79 U/L (ref 0–55)
BASOPHILS # BLD AUTO: 0.1 10^3/UL (ref 0–0.1)
BASOPHILS NFR BLD AUTO: 1 % (ref 0–10)
BILIRUB SERPL-MCNC: 0.4 MG/DL (ref 0.1–1)
BUN/CREAT SERPL: 13
CALCIUM SERPL-MCNC: 8.5 MG/DL (ref 8.5–10.1)
CHLORIDE SERPL-SCNC: 103 MMOL/L (ref 98–107)
CO2 SERPL-SCNC: 25 MMOL/L (ref 21–32)
CREAT SERPL-MCNC: 1 MG/DL (ref 0.6–1.3)
EOSINOPHIL # BLD AUTO: 0.5 10^3/UL (ref 0–0.3)
EOSINOPHIL NFR BLD AUTO: 5 % (ref 0–10)
GFR SERPLBLD BASED ON 1.73 SQ M-ARVRAT: > 60 ML/MIN
GLUCOSE SERPL-MCNC: 92 MG/DL (ref 70–105)
HCT VFR BLD CALC: 33 % (ref 40–54)
HGB BLD-MCNC: 10.3 G/DL (ref 13.3–17.7)
LYMPHOCYTES # BLD AUTO: 2.4 10^3/UL (ref 1–4)
LYMPHOCYTES NFR BLD AUTO: 26 % (ref 12–44)
MANUAL DIFFERENTIAL PERFORMED BLD QL: NO
MCH RBC QN AUTO: 30 PG (ref 25–34)
MCHC RBC AUTO-ENTMCNC: 31 G/DL (ref 32–36)
MCV RBC AUTO: 95 FL (ref 80–99)
MONOCYTES # BLD AUTO: 0.8 10^3/UL (ref 0–1)
MONOCYTES NFR BLD AUTO: 9 % (ref 0–12)
NEUTROPHILS # BLD AUTO: 5.4 10^3/UL (ref 1.8–7.8)
NEUTROPHILS NFR BLD AUTO: 59 % (ref 42–75)
PLATELET # BLD: 198 10^3/UL (ref 130–400)
PMV BLD AUTO: 10 FL (ref 9–12.2)
POTASSIUM SERPL-SCNC: 3.7 MMOL/L (ref 3.6–5)
PROT SERPL-MCNC: 6.1 GM/DL (ref 6.4–8.2)
SODIUM SERPL-SCNC: 138 MMOL/L (ref 135–145)
WBC # BLD AUTO: 9.2 10^3/UL (ref 4.3–11)

## 2021-01-14 RX ADMIN — ENOXAPARIN SODIUM SCH MG: 100 INJECTION SUBCUTANEOUS at 09:09

## 2021-01-14 RX ADMIN — DOCUSATE SODIUM SCH MG: 100 CAPSULE ORAL at 20:21

## 2021-01-14 RX ADMIN — METOPROLOL TARTRATE SCH MG: 50 TABLET, FILM COATED ORAL at 20:22

## 2021-01-14 RX ADMIN — DOCUSATE SODIUM SCH MG: 100 CAPSULE ORAL at 09:10

## 2021-01-14 RX ADMIN — POLYETHYLENE GLYCOL (3350) SCH GM: 17 POWDER, FOR SOLUTION ORAL at 20:22

## 2021-01-14 RX ADMIN — METOPROLOL TARTRATE SCH MG: 50 TABLET, FILM COATED ORAL at 09:07

## 2021-01-14 RX ADMIN — DOCUSATE SODIUM AND SENNOSIDES SCH EA: 8.6; 5 TABLET, FILM COATED ORAL at 09:10

## 2021-01-14 RX ADMIN — POLYETHYLENE GLYCOL (3350) SCH GM: 17 POWDER, FOR SOLUTION ORAL at 09:10

## 2021-01-14 RX ADMIN — DOCUSATE SODIUM AND SENNOSIDES SCH EA: 8.6; 5 TABLET, FILM COATED ORAL at 20:22

## 2021-01-14 RX ADMIN — FAMOTIDINE SCH MG: 20 TABLET, FILM COATED ORAL at 09:07

## 2021-01-14 NOTE — PM&R PROGRESS NOTE
Subjective


HPI/CC On Admission


Date Seen by Provider:  Jan 14, 2021


Time Seen by Provider:  10:30


Subjective/Events-last exam


1/14/21:


Wants PEG tube removed


History of placement 12/16/20


No issues


Up in chair today


Tachycardia noted and on BB


NO pain





Review of Systems


General:  Fatigue, Malaise


Neurological:  Weakness, Incoordination





Objective


Exam


Vital Signs





Vital Signs








  Date Time  Temp Pulse Resp B/P (MAP) Pulse Ox O2 Delivery O2 Flow Rate FiO2


 


1/14/21 21:36      Room Air  


 


1/14/21 20:22  105  120/80 (93)    


 


1/14/21 18:00 37.2  18  97   





Capillary Refill :


General Appearance:  No Apparent Distress, WD/WN, Anxious, Thin


HEENT:  PERRL/EOMI, Normal ENT Inspection, Pharynx Normal


Neck:  Full Range of Motion, Normal Inspection, Non Tender, Supple, Carotid 

Bruit


Respiratory:  Chest Non Tender, Lungs Clear, No Accessory Muscle Use, No 

Respiratory Distress, Decreased Breath Sounds


Cardiovascular:  Regular Rate, Rhythm, No Edema, No Gallop, No JVD, No Murmur, 

Normal Peripheral Pulses


Gastrointestinal:  Normal Bowel Sounds, No Organomegaly, No Pulsatile Mass, Non 

Tender, Soft


Back:  Normal Inspection, No CVA Tenderness, No Vertebral Tenderness


Extremity:  Normal Capillary Refill, Normal Inspection, Normal Range of Motion, 

Non Tender, No Calf Tenderness, No Pedal Edema


Neurologic/Psychiatric:  Alert, Oriented x3, No Motor/Sensory Deficits, Abnormal

Gait, Depressed Affect, Motor Weakness (generalized all extremities)


Skin:  Normal Color, Warm/Dry


Lymphatic:  No Adenopathy





Results/Procedures


Lab


Laboratory Tests


1/14/21 05:45








Patient resulted labs reviewed.





FIM


Transfers


Therapy Code Descriptions/Definitions 





Functional [place] Measure:


0=Not Assessed/NA        4=Minimal Assistance


1=Total Assistance        5=Supervision or Setup


2=Maximal Assistance  6=Modified [place]


3=Moderate Assistance 7=Complete IndependenceSCALE: Activities may be completed 

with or without assistive devices.





6-Indepedent-patient completes the activity by him/herself with no assistance 

from a helper.


5-Set-up or Clean-up Assistance-helper sets up or cleans up; patient completes 

activity. [place] assists only prior to or  


    following the activity.


4-Supervision or Touching Assistance-helper provides verbal cues and/or 

touching/steadying and/or contact guard assistance as patient completes 

activity. Assistance may be provided   


    throughout the activity or intermittently.


3-Partial/Moderate Assistance-helper does LESS THAN HALF the effort. [place] 

lifts, holds or supports trunk or limbs, but provides less than half the effort.


2-Substantial/Maximal Assistance-helper does MORE THAN HALF the effort. [place] 

lifts or holds trunk or limbs and provides more than half the effort.


9-Xrycwuglm-xahgab does ALL the effort. Patient does none of the effort to 

complete the activity. Or, the assistance of 2 or more helpers is required for 

the patient to complete the  


    activity.


If activity was not attempted, code reason:


7-Patient Refused.


9-Not Applicable-not attempted and the patient did not perform the activity 

before the current illness, exacerbation or injury.


10-Not Attempted due to Environmental Limitations-(lack of equipment, weather 

restraints, etc.).


88-Not Attempted due to Medical Conditions or Safety Concerns.


Roll Left to Right (QC):  6


Sit to Lying (QC):  3


Sit to Stand (QC):  1


Chair/Bed-to-Chair Xfer(QC):  88


Car Transfer (QC):  88





Gait Training


Does the Patient Walk?:  No and Walking Goal IS indicated


Walk 10 feet (QC):  88


Walk 50 ft with 2 Turns(QC):  88


Walk 150 ft (QC):  88


Walking 10ft/uneven surface-QC:  88


Gait Assistive Device:  FWW





Wheelchair Training


Does the Pt Use a Wheelchair?:  No


Wheel 50 ft with 2 turns (QC):  9


Wheel 150 ft (QC):  9


Type of Wheelchair:  N/A





Stair Training


1 Step (curb) (QC):  88


4 Steps (QC):  88


12 Steps (QC):  88





Balance


Picking up an Object (QC):  88





ADL-Treatment


Eating (QC):  4 (Pt. issued weighted utensils to assist with tremors when 

feeding self.)


Oral Hygiene (QC):  7


Shower/Bathe Self (QC):  88


Upper Body Dressing (QC):  2 (Pt. reports that today was the first day he has h

ad street clothing on.  Nursing at previous location donned shirt for him.)


Lower Body Dressing (QC):  2


On/Off Footwear (QC):  2


Toileting Hygiene (QC):  2 (Pt. reports that he requires max assist for 

toileting tasks.)





Assessment/Plan


Assessment and Plan


Assess & Plan/Chief Complaint


Assessment:


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


Anemia


s/p trach now DC


Steroid maintenance





Plan:


IRF protocol


Increase ADL independence


Ambulate


O2 monitoring





1/14/21:


PEG tube when able to DC will DC


Monitor BP and HR


Monitor O2





(1) Myopathy


(2) History of tracheostomy


(3) Frailty


(4) Cough


(5) Anemia


(6) COVID-19











THOR MATHEWS DO                Jan 14, 2021 10:33

## 2021-01-14 NOTE — OCCUPATIONAL THER DAILY NOTE
OT Current Status-Daily Note


Subjective


Pt agreed to PT/OT cotreat. After activity, pt reminded to breath in through 

nose and out through mouth, as well as taking deep breaths.





Mental Status/Objective


Patient Orientation:  Person, Place, Time, Situation





ADL-Treatment


Therapy Code Descriptions/Definitions 





Functional [place] Measure:


0=Not Assessed/NA        4=Minimal Assistance


1=Total Assistance        5=Supervision or Setup


2=Maximal Assistance  6=Modified [place]


3=Moderate Assistance 7=Complete IndependenceSCALE: Activities may be completed 

with or without assistive devices.





6-Indepedent-patient completes the activity by him/herself with no assistance 

from a helper.


5-Set-up or Clean-up Assistance-helper sets up or cleans up; patient completes 

activity. [place] assists only prior to or  


    following the activity.


4-Supervision or Touching Assistance-helper provides verbal cues and/or 

touching/steadying and/or contact guard assistance as patient completes activi

ty. Assistance may be provided   


    throughout the activity or intermittently.


3-Partial/Moderate Assistance-helper does LESS THAN HALF the effort. [place] 

lifts, holds or supports trunk or limbs, but provides less than half the effort.


2-Substantial/Maximal Assistance-helper does MORE THAN HALF the effort. [place] 

lifts or holds trunk or limbs and provides more than half the effort.


3-Lwcfpopck-jaqnks does ALL the effort. Patient does none of the effort to 

complete the activity. Or, the assistance of 2 or more helpers is required for 

the patient to complete the  


    activity.


If activity was not attempted, code reason:


7-Patient Refused.


9-Not Applicable-not attempted and the patient did not perform the activity 

before the current illness, exacerbation or injury.


10-Not Attempted due to Environmental Limitations-(lack of equipment, weather 

restraints, etc.).


88-Not Attempted due to Medical Conditions or Safety Concerns.


Oral Hygiene (QC):  4 (SBA, pt required assistance opening package prior to 

task.)


Shower/Bathe Self (QC):  1 (pt able to wash trunk, BUE, and thighs. Assistance 

x2 needed for standing while washing buttocks.)


Upper Body Dressing (QC):  2 (MaxA, pt able to thread arms through shirt, assist

managing up past elbow. Assist threading overhead and adjustment down trunk)


Lower Body Dressing (QC):  1 (Assist x2 in stand for pant hike. Pt required 

assistance threading BLEs into pants.)


On/Off Footwear:  1 (Pt attempted to doff socks but unsuccessful, assist to doff

and don bilateral gripper socks.)





Other Treatment


OT/PT cotreat due to skill of 2 clinicians required which a rehab tech could not

perform in order to coordinate UE/LEs with tasks due to pt's limitations in 

strength, endurance, functional mobility, and transfers. OT focused on ADLS, UE 

placement, cues for sequencing and safety while PT focused on LE placement, 

gross overall movements, transfers and mobility. Pt transferred supine to sit 

EOB. Then completed sponge bath adn dressing. Assistance needed to wash feet and

apply lotion to pt skin. Pt stood with assistance x2 for washing buttocks and 

doff pants due to decreased standing balance. OT assisted with threading unde

rwear and pants. Pt stood again for pants hike. Pt brushed teeth at EOB, assist 

to open toothbrush packet. Pt transferred from bed to recliner using FWW. In 

recliner, pt participated in UE exercises of active R shoulder flexion and 

passive L shoulder flexion. Pt was left in recliner with call light in reach and

all needs met.





Education


OT Patient Education:  Correct positioning, Energy conservation, Modified ADL 

techniques, Progress toward Goal/Update tx plan, Purpose of tx/functional 

activities, Transfer techniques


Teaching Recipient:  Patient


Teaching Methods:  Demonstration, Discussion


Response to Teaching:  Verbalize Understanding, Return Demonstration





OT Short Term Goals


Short Term Goals


Time Frame:  2021


Eatin


Oral hygiene:  5


Toileting hygiene:  4


Shower/bathe self:  4


Upper body dressin


Lower body dressin


Putting on/taking off footwear:  4





OT Long Term Goals


Long Term Goals


Time Frame:  Feb 10, 2021


Eating (QC):  6


Oral Hygiene (QC):  6


Toileting Hygiene (QC):  6


Shower/Bathe Self (QC):  6


Upper Body Dressing (QC):  6


Lower Body Dressing (QC):  6


On/Off Footwear (QC):  6


Additional Goals:  1-Demonstrate ADL Tasks, 2-Verbalize Understanding, 3-

ImproveStrength/[place]


1=Demonstrate adherence to instructed precautions during ADL tasks.


2=Patient will verbalize/demonstrate understanding of assistive 

devices/modifications for ADL.


3=Patient will improve strength/tolerance for activity to enable patient to 

perform ADL's.





OT Education/Plan


Problem List/Assessment


Assessment:  Decreased Activ Tolerance, Decreased UE Strength, Impaired Bed 

Mobility, Impaired Coordination, Impaired Funct Balance, Impaired I ADL's, 

Impaired Self-Care Skills, Restricted Funct UE ROM





Discharge Recommendations


Plan/Recommendations:  Continue POC





Treatment Plan/Plan of Care


Patient would benefit from OT for education, treatment and training to promote 

independence in ADL's, mobility, safety and/or upper extremity function for AD

L's.


Plan of Care:  ADL Retraining, Caregiver Training, Concurrent Therapy, 

Functional Mobility, Group Exercise/Act as Ind, UE Funct Exercise/Act, UE 

Neuromus Re-Ed/Coord


Treatment Duration:  2021


Frequency:  Modified Program (IRF)


Estimated Hrs Per Day:  1 hour per day


Agreement:  Yes


Rehab Potential:  Good





Time/GCodes


Start Time:  10:00


Stop Time:  11:00


Total Time Billed (hr/min):  60


Billed Treatment Time


1, ADL 3 (45'), EX (15')











GADIEL MAS OT          2021 13:17

## 2021-01-14 NOTE — NUR
CM/SS ADMISSION

Patient was admitted to ARU 1/13/21 from [facility] for Critical Illness 
Myopathy.  Patient was Covid positive 11/12/20 and was treating at home when he 
progressively worsened.  He went to [facility] ED with O2 sats 57% on 5L.  His care 
escalated from BiPAP to intubation and because wean was unsuccessful he had trach and PEG 
was placed.  He transferred to [facility] 12/18/20 with further complications during that stay 
including bleed from tracheostomy stoma and PNA.



Patient was independent prior to onset of this illness, he works full time.  His SO/Jami Arana resides with him and her parents live in the same block.  Patient shared he was 
quite an athlete historically and could run a 5 min. mile.  He is very thankful for his 
recovery from this devastating journey with Covid at 8 weeks now.



PCP:  Dr. Grayson Hull

[facility]

[address] [place]  [address]

PH:  [phone]



PHARMACY: [facility]



INSURANCE:  [employer]



DME:  None.  His family/friends are putting in grab bars in the bathroom and adding hand 
rails to the deck entrance.  Patient indicates he has 3 steps into his home.



BARRIERS TO DISCHARGE PLANNING.  Nothing monumental noted.  Patient has commercial insurance 
which will likely require in-network services/equipment.  Patient very determined to 
ambulate and not require wheelchair.



CONTACTS:

Jami Arana, Significant Other

[address] [place]  [address] [phone]



Patient has 4 children and has grandchildren who call him "Granddad".



Patient understands the purpose and process of the weekly patient care conference and that 
his first review is Wednesday, January 20, 2021.

## 2021-01-14 NOTE — NUR
RD ASSESSMENT 



PMHx: pneumonia; COVID-19



PT INTERACTION: Pt was awake and pleasant during consult for MST score. Pt states current 
appetite is good. Note avg PO intake 100% x1d, per chart review. Pt states following a 
regular diet at home, and has no issues with chewing/swallowing food. Pt states no recent 
issues with n/v/c/d, and that his last BM was 1/12. Note pt currently on bowel regimen of 
colace BID, senna BID, and miralax BID, per chart review. Pt states recent 36-40# wt loss, 
but did not know timeframe. Note unable to determine recent wt hx, per chart review. Upon 
visual assessment, pt appears to be adequately nourished with no visible signs of muscle/fat 
wasting, and a BMI of 26.0 (Normal BMI for age). 



Given visual assessment, PO intake, and wt hx, pt does not meet criteria for malnutrition 
per ASPEN guidelines. 



Est. kcal needs: 2308-0454 kcal | 25-30 kcal/kg  

Est. Pro needs:   g Pro | 1.0-1.2 g Pro/kg 



PES STATEMENT: Given current PO intake, no nutrition diagnosis at this time (NO-1.1).



INTERVENTION:  

Continue with current diet order of Regular diet. 

Will continue to follow and reassess as pt needs, intake, and status change. 





SHIRA NORMAN MS RD LD 

[phone] cell

## 2021-01-14 NOTE — PHYSICAL THERAPY DAILY NOTE
PT Daily Note-Current


Subjective


Agreeable to PT.  Expresses that he really wants to  increase his exercises.  

Notes he needs frequent rest breaks.





Mental Status


Patient Orientation:  Person, Place, Time, Situation





Transfers


SCALE: Activities may be completed with or without assistive devices.





6-Indepedent-patient completes the activity by him/herself with no assistance 

from a helper.


5-Set-up or Clean-up Assistance-helper sets up or cleans up; patient completes 

activity. [place] assists only prior to or  


    following the activity.


4-Supervision or Touching Assistance-helper provides verbal cues and/or 

touching/steadying and/or contact guard assistance as patient completes 

activity. Assistance may be provided   


    throughout the activity or intermittently.


3-Partial/Moderate Assistance-helper does LESS THAN HALF the effort. [place] 

lifts, holds or supports trunk or limbs, but provides less than half the effort.


2-Substantial/Maximal Assistance-helper does MORE THAN HALF the effort. [place] 

lifts or holds trunk or limbs and provides more than half the effort.


9-Ysyubcqyf-rapttt does ALL the effort. Patient does none of the effort to 

complete the activity. Or, the assistance of 2 or more helpers is required for 

the patient to complete the  


    activity.


If activity was not attempted, code reason:


7-Patient Refused.


9-Not Applicable-not attempted and the patient did not perform the activity 

before the current illness, exacerbation or injury.


10-Not Attempted due to Environmental Limitations-(lack of equipment, weather 

restraints, etc.).


88-Not Attempted due to Medical Conditions or Safety Concerns.


Roll Left & Right (QC):  3


Lying to Sitting/Side of Bed(Q:  3 (min assist to sit up and takes extra effort.

 Skilled cues to sequence.)


Sit to Stand (QC):  2 (mod assist to come to a stand and mod assist initially to

gain balacne with skilled cues for upright posture. )


Chair/Bed-to-Chair Xfer(QC):  3 (min assist with assist for the walker and 

skilled cues to sequence. )





Weight Bearing


Right Lower Extremity:  Right


Full Weight Bearing


Left Lower Extremity:  Left


Full Weight Bearing





Exercises


Seated Therapy Exercises:  Ankle pumps, Long arc quads, Hip flexion, Hip abd/add


Seated Reps:  10 (LE strength for transfer progression)





Treatments


Co treat with OT, as pt requires the skill of 2 clinicians to successfully 

complete ADL tasks and perform core strength, seated balance, transitional sit 

to stand and transfers.  OT addressed ADL care as PT addressed the balance, si

tting activity and standing/transfers.





Assessment


Current Status:  Good Progress


Tires quickly and requires rest breaks.  Poor quad control in standing with 

limited functional activity toelrance.  Pt very motivated and pushes himself.





PT Short Term Goals


Short Term Goals


Time Frame:  2021


Roll Left & Right:  6


Sit to lyin


Lying to sitting on side of be:  6


Sit to stand:  6


Chair/bed-to-chair transfer:  6





PT Long Term Goals


Long Term Goals


PT Long Term Goals Time Frame:  Feb 3, 2021


Roll Left & Right (QC):  6


Sit to Lying (QC):  6


Lying-Sitting on Side/Bed(QC):  6


Sit to Stand (QC):  6


Chair/Bed-to-Chair Xfer(QC):  6


Toilet Transfer (QC):  6


Car Transfer (QC):  5


Does the Patient Walk:  No and Walking Goal IS indicated


Walk 10 feet (QC):  6


Walk 50ft with 2 Turns (QC):  6


Walk 150 ft (QC):  6


Walking 10ft on Uneven Surface:  6


1 Step (curb) (QC):  6


4 Steps (QC):  5


12 Steps (QC):  5


Picking up an Object (QC):  6


Does the Pt use WC or Scooter?:  No


Wheel 50 feet with 2 turns (QC:  9


Type:  N/A


Wheel 150 feet:  9


Type:  N/A





PT Plan


Problem List


Problem List:  Activity Tolerance, Functional Strength, Safety, Balance, Gait, 

Transfer, Bed Mobility





Treatment/Plan


Treatment Plan:  Continue Plan of Care


Treatment Plan:  Bed Mobility, Education, Functional Activity [place], Functional 

Strength, Group Therapy, Gait, Safety, Therapeutic Exercise, Transfers


Treatment Duration:  Feb 3, 2021


Frequency:  Modified Program (IRF)


Estimated Hrs Per Day:  Other


Patient and/or Family Agrees t:  Yes





Safety Risks/Education


Patient Education:  Transfer Techniques, Safety Issues


Teaching Recipient:  Patient


Teaching Methods:  Demonstration, Discussion


Response to Teaching:  Reinforcement Needed





Discharge Recommendations


Therapy Discharge Recommendati:  Post Acute PT





Time/GCodes


Time In:  1000


Time Out:  1100


Total Billed Treatment Time:  60


Total Billed Treatment


visit


FA 45


EX 15


Co treat 60











LIDIA LANGFORD PT             2021 15:18

## 2021-01-14 NOTE — INDIVIDUALIZED PLAN OF CARE
Individualized Plan of Care


Rehab Nursing IPOC Order


Admission Date


Jan 13, 2021 at 10:19


Current Orders





Orders


Admission Order(Inpt,Obs,Sdc) (1/13/21 09:33)


Vital Signs: Per Unit Policy ( 08,16,00 (1/13/21 09:33)


Joe Hose 09,21 (1/13/21 09:33)


Sequential Compression Device Q4H (1/13/21 09:33)


-Inpt Rehab Con (1/13/21 09:33)


Rehab Nursing Orders-Ipoc (1/13/21 09:33)


Physical Therapy Rehab Orders (1/13/21 09:33)


Occupational Therapy Rehab Ord (1/13/21 09:33)


Speech Therapy Rehab Orders (1/13/21 09:33)


Cbc With Automated Diff (1/14/21 06:00)


Comprehensive Metabolic Panel (1/14/21 06:00)


General/Regular (1/13/21 Lunch)


Intake & Output 06,14,22 (1/13/21 09:33)


Precautions (Aru) (1/13/21 09:33)


Rehab-Intensity Of Therapy (1/13/21 09:33)


Initiate Admission Nursing Pro .admission (1/13/21 09:33)


Alprazolam Tablet (Xanax Tablet) (1/13/21 09:45)


Calcium Carbonate Chew Tablet (Antacid C (1/13/21 09:45)


Diphenhydramine Tablet (Benadryl Tablet) (1/13/21 09:45)


Docusate Sodium Capsule (Colace Capsule) (1/13/21 21:00)


Docusate Sodium Capsule (Colace Capsule) (1/13/21 09:45)


Bisacodyl Suppository (Dulcolax Supposit (1/13/21 09:45)


Lactulose Oral Solution (Enulose Oral So (1/13/21 09:45)


Na Phos/Na Biphos Enema (Fleet Enema Prakash (1/13/21 09:45)


Guaifenesin/Codeine Syrup (Robitussin Ac (1/13/21 09:45)


Loperamide Tablet (Imodium Tablet) (1/13/21 09:45)


Melatonin  Tablet (Melatonin Tablet) (1/13/21 09:45)


Polyethylene Glycol Powder Pkt (Miralax (1/13/21 21:00)


Ondansetron  Oral Dissolve Tab (Zofran (1/13/21 09:45)


Senna S Tablet (Senokot S Tablet) (1/13/21 21:00)


Sequential Compression Device Q4H (1/13/21 09:33)


Initiate Admission Nursing Pro .admission (1/13/21 09:33)


Admission Arrival Bed Request (1/13/21 10:18)


Acetaminophen Tablet/Caplet (Tylenol  T (1/13/21 11:15)


Enoxaparin Injection (Lovenox Injection) (1/14/21 09:00)


Famotidine Tablet (Pepcid Tablet) (1/14/21 09:00)


Albuterol/Ipra Inhalation Soln (Duoneb I (1/13/21 11:15)


Prednisone Tablet (Deltasone Tablet) (1/14/21 09:00)


Metoprolol Tartrate (Ir) Tab (Lopressor (1/13/21 21:00)


Patient Visit (1/13/21 )


Pt Eval High Complexity (1/13/21 )


Functional Activities, Ea 15 (1/13/21 )


Pharmacy To Dose (Pharmacy To Dose) (1/13/21 13:00)


Patient Visit (1/13/21 )


Exercise Therap, Ea 15 Min (1/13/21 )


Patient Visit (1/13/21 )


Speech Sound Lang Comp (1/13/21 )


Patient Visit (1/14/21 )


Functional Activities, Ea 15 (1/14/21 )


Exercise Therap, Ea 15 Min (1/14/21 )


Patient Visit (1/14/21 )


Exercise Therap, Ea 15 Min (1/14/21 )





Rehab Nursing Orders:  Ongoing Assess. of Cognitive Status, Ongoing Assess. of 

Function Status, Bladder Management, Bladder Scan, Bladder Training, Bowel 

Management, Bowel Training, Disease Management & Educaiton, DVT Prophylaxis, 

Fall Prevention, Fluid/Electrolyte/Nutrition Mgmt, Infection Prevention, 

Medication Management & Education, Management of Risks & Complications, 

Management of Skin Intergrity, Nutrition Management, Pain Management, 

Patient/Family Support, Safety Management





Intensity of Therapy to be met


Patient to be seen:  Min.3h per day/5 of 7d





PT IPOC


Problem List:  Activity Tolerance, Functional Strength


Treatment Plan:  Continue Plan of Care


Bed Mobility, Education, Functional Activity [place], Functional Strength, Group 

Therapy, Gait, Safety, Therapeutic Exercise, Transfers


Treatment Duration:  Feb 3, 2021


Frequency:  Modified Program (IRF)


Estimated Hrs Per Day:  Other





OT IPOC


Problems:  Decreased Activ Tolerance, Decreased UE Strength, Dependent 

Transfers, Impaired Bed Mobility, Impaired Funct Balance, Impaired I ADL's, 

Impaired Self-Care Skills, Restricted Funct UE ROM


OT Treatment, Training and Edu:  Yes


Plan of Care:  ADL Retraining, Caregiver Training, Concurrent Therapy, 

Functional Mobility, Group Exercise/Act as Ind, UE Funct Exercise/Act, UE 

Neuromus Re-Ed/Coord


Treatment Duration:  Jan 27, 2021


Frequency:  Modified Program (IRF)


Estimated Hrs Per Day:  1 hour per day





ST IPOC


Speech Therapy Treatment Plan:  Discontinue ST


Treatment Duration:  Jan 13, 2021


Frequency:  1 time per week


Estimated Hrs Per Day:  .25 hour per day





/Case Mgmt


/Case Managemen:  Discharge Planning





Dietitian/Nutritionist


Dietitian/Nutritionist to monitor nutritional status and make changes and/or 

recommendations as needed and work with speech pathology on dietary upgrades as 

the occur.





Physician IPOC


Medical Issues being managed closely and that require the 24 hour availability 

of a physician:





Recent catastrophic critical illness with COVID and subsequent lengthy 

ventilator status will be at high risk for decompensation


Medical Issues:  Bowel/Bladder Function, DVT Prophylaxis, Falls Precautions, 

Fluid/Electrolyte/Nutrition Balance, Infection Protection, Pain Management, 

Swallowing Precautions


Brief Synthesis of Preadmission Screen, Post-Admission Evaluation, and Therapy 

Evaluations:





PT OT will focus on regaining strength to severe myopathic muscles of all 

extremities and will help increase ADL independence


Medical Prognosis:  Good


Anticipated Length of Stay:  10 days











THOR MATHEWS DO                Jan 14, 2021 10:33

## 2021-01-14 NOTE — PHYSICAL THERAPY DAILY NOTE
PT Daily Note-Current


Subjective


Pt presents supine in bed upon arrival to room, agreeable to supine LE exercises

at this time. No reports of pain.





Appearance


Pt supine in bed upon arrival to room, call light and tray within reach, All 

needs met at this time.





Mental Status


Patient Orientation:  Person, Place, Situation





Transfers


SCALE: Activities may be completed with or without assistive devices.





6-Indepedent-patient completes the activity by him/herself with no assistance 

from a helper.


5-Set-up or Clean-up Assistance-helper sets up or cleans up; patient completes 

activity. [place] assists only prior to or  


    following the activity.


4-Supervision or Touching Assistance-helper provides verbal cues and/or 

touching/steadying and/or contact guard assistance as patient completes 

activity. Assistance may be provided   


    throughout the activity or intermittently.


3-Partial/Moderate Assistance-helper does LESS THAN HALF the effort. [place] 

lifts, holds or supports trunk or limbs, but provides less than half the effort.


2-Substantial/Maximal Assistance-helper does MORE THAN HALF the effort. [place] 

lifts or holds trunk or limbs and provides more than half the effort.


0-Jwdklbvrk-hsvolq does ALL the effort. Patient does none of the effort to 

complete the activity. Or, the assistance of 2 or more helpers is required for 

the patient to complete the  


    activity.


If activity was not attempted, code reason:


7-Patient Refused.


9-Not Applicable-not attempted and the patient did not perform the activity 

before the current illness, exacerbation or injury.


10-Not Attempted due to Environmental Limitations-(lack of equipment, weather 

restraints, etc.).


88-Not Attempted due to Medical Conditions or Safety Concerns.





Weight Bearing


Right Lower Extremity:  Right


Full Weight Bearing


Left Lower Extremity:  Left


Full Weight Bearing





Exercises


Supine Ex:  Bridging, Ankle pumps, Quad Set, Heel Slides, Hip abd/add


Supine Reps:  20





Treatments


Supine LE strengthening





Assessment


Current Status:  Fair Progress


Pt tolerated LE strengthening exercises well, no SOB noted. Pt RLE with 

increased weakness compared to LLE this date.





PT Short Term Goals


Short Term Goals


Time Frame:  2021


Roll Left & Right:  6


Sit to lyin


Lying to sitting on side of be:  6


Sit to stand:  6


Chair/bed-to-chair transfer:  6





PT Long Term Goals


Long Term Goals


PT Long Term Goals Time Frame:  Feb 3, 2021


Roll Left & Right (QC):  6


Sit to Lying (QC):  6


Lying-Sitting on Side/Bed(QC):  6


Sit to Stand (QC):  6


Chair/Bed-to-Chair Xfer(QC):  6


Toilet Transfer (QC):  6


Car Transfer (QC):  5


Does the Patient Walk:  No and Walking Goal IS indicated


Walk 10 feet (QC):  6


Walk 50ft with 2 Turns (QC):  6


Walk 150 ft (QC):  6


Walking 10ft on Uneven Surface:  6


1 Step (curb) (QC):  6


4 Steps (QC):  5


12 Steps (QC):  5


Picking up an Object (QC):  6


Does the Pt use WC or Scooter?:  No


Wheel 50 feet with 2 turns (QC:  9


Type:  N/A


Wheel 150 feet:  9


Type:  N/A





PT Plan


Problem List


Problem List:  Activity Tolerance, Functional Strength, Safety, Balance, Gait, 

Transfer, Bed Mobility, ROM





Treatment/Plan


Treatment Plan:  Continue Plan of Care


Treatment Plan:  Bed Mobility, Education, Functional Activity [place], Functional 

Strength, Group Therapy, Gait, Safety, Therapeutic Exercise, Transfers


Treatment Duration:  Feb 3, 2021


Frequency:  Modified Program (IRF)


Estimated Hrs Per Day:  Other


Patient and/or Family Agrees t:  Yes





Time/GCodes


Time In:  1405


Time Out:  1435


Total Billed Treatment Time:  30


Total Billed Treatment


1 visit 


EX (30')











JACKI DALE PT                 2021 14:58

## 2021-01-15 VITALS — SYSTOLIC BLOOD PRESSURE: 115 MMHG | DIASTOLIC BLOOD PRESSURE: 72 MMHG

## 2021-01-15 VITALS — DIASTOLIC BLOOD PRESSURE: 72 MMHG | SYSTOLIC BLOOD PRESSURE: 128 MMHG

## 2021-01-15 RX ADMIN — FAMOTIDINE SCH MG: 20 TABLET, FILM COATED ORAL at 09:18

## 2021-01-15 RX ADMIN — POLYETHYLENE GLYCOL (3350) SCH GM: 17 POWDER, FOR SOLUTION ORAL at 20:11

## 2021-01-15 RX ADMIN — POLYETHYLENE GLYCOL (3350) SCH GM: 17 POWDER, FOR SOLUTION ORAL at 09:21

## 2021-01-15 RX ADMIN — DOCUSATE SODIUM SCH MG: 100 CAPSULE ORAL at 09:21

## 2021-01-15 RX ADMIN — METOPROLOL TARTRATE SCH MG: 50 TABLET, FILM COATED ORAL at 20:18

## 2021-01-15 RX ADMIN — ENOXAPARIN SODIUM SCH MG: 100 INJECTION SUBCUTANEOUS at 09:21

## 2021-01-15 RX ADMIN — DOCUSATE SODIUM SCH MG: 100 CAPSULE ORAL at 20:11

## 2021-01-15 RX ADMIN — DOCUSATE SODIUM AND SENNOSIDES SCH EA: 8.6; 5 TABLET, FILM COATED ORAL at 09:21

## 2021-01-15 RX ADMIN — METOPROLOL TARTRATE SCH MG: 50 TABLET, FILM COATED ORAL at 09:19

## 2021-01-15 RX ADMIN — DOCUSATE SODIUM AND SENNOSIDES SCH EA: 8.6; 5 TABLET, FILM COATED ORAL at 20:11

## 2021-01-15 NOTE — NUR
responded to referral from physician to assist pt in grief process.  he has lost 3 
close family members in the past 2 months.  Timing for this visit was not the best.  Pt had 
finished therapy and was tired.  he preferred to take a nap before the next therapy session. 
  will continue to seek opportunities to deepen trusting relationship and facilitate 
the grief process through reminiscence to the degree pt desires.

## 2021-01-15 NOTE — PHYSICAL THERAPY DAILY NOTE
PT Daily Note-Current


Subjective


Pt finished with OT and moved directly into working with PT.  Pt eager to begin 

therapy today.





Transfers


SCALE: Activities may be completed with or without assistive devices.





6-Indepedent-patient completes the activity by him/herself with no assistance 

from a helper.


5-Set-up or Clean-up Assistance-helper sets up or cleans up; patient completes 

activity. [place] assists only prior to or  


    following the activity.


4-Supervision or Touching Assistance-helper provides verbal cues and/or 

touching/steadying and/or contact guard assistance as patient completes 

activity. Assistance may be provided   


    throughout the activity or intermittently.


3-Partial/Moderate Assistance-helper does LESS THAN HALF the effort. [place] 

lifts, holds or supports trunk or limbs, but provides less than half the effort.


2-Substantial/Maximal Assistance-helper does MORE THAN HALF the effort. [place] 

lifts or holds trunk or limbs and provides more than half the effort.


8-Eqdlykhiv-dkctfp does ALL the effort. Patient does none of the effort to 

complete the activity. Or, the assistance of 2 or more helpers is required for 

the patient to complete the  


    activity.


If activity was not attempted, code reason:


7-Patient Refused.


9-Not Applicable-not attempted and the patient did not perform the activity 

before the current illness, exacerbation or injury.


10-Not Attempted due to Environmental Limitations-(lack of equipment, weather 

restraints, etc.).


88-Not Attempted due to Medical Conditions or Safety Concerns.





Weight Bearing


Right Lower Extremity:  Right


Full Weight Bearing


Left Lower Extremity:  Left


Full Weight Bearing





Gait Training


Does the Patient Walk?:  Yes


Distance:  3ft


Gait Persons Needed:  1


Gait Assistive Device:  FWW


max assist for ambulation due to (B) LE weakness.





Wheelchair Training


Does the Pt Use a Wheelchair?:  Yes


Wheel 50 ft with 2 turns (QC):  3


Type of Wheelchair:  Manual





Exercises


Seated Therapy Exercises:  LE Protocol


Seated Reps:  20


Standing:  3 way Ex=Flex, Abd, Ext


Standing Reps:  10


Max assist during all standing ex, with pt requiring assist under the arms due 

to feeding tube in abdomen.


NuStep Minutes:  10


 NuStep Workload:  1





Treatments


Sit to stand training to work on glute firing and knee extension upon standing.





Assessment


Current Status:  Fair Progress


Pt fatigues rapidly and oxygen monitoring shows a low of 83% after standing for 

1 minute, with a return to 93% within 2 minutes. Able to maintain a 92%-95% 

saturation during NuStep and supine ex.





PT Short Term Goals


Short Term Goals


Time Frame:  2021


Roll Left & Right:  6


Sit to lyin


Lying to sitting on side of be:  6


Sit to stand:  6


Chair/bed-to-chair transfer:  6





PT Long Term Goals


Long Term Goals


PT Long Term Goals Time Frame:  Feb 3, 2021


Roll Left & Right (QC):  6


Sit to Lying (QC):  6


Lying-Sitting on Side/Bed(QC):  6


Sit to Stand (QC):  6


Chair/Bed-to-Chair Xfer(QC):  6


Toilet Transfer (QC):  6


Car Transfer (QC):  5


Does the Patient Walk:  No and Walking Goal IS indicated


Walk 10 feet (QC):  6


Walk 50ft with 2 Turns (QC):  6


Walk 150 ft (QC):  6


Walking 10ft on Uneven Surface:  6


1 Step (curb) (QC):  6


4 Steps (QC):  5


12 Steps (QC):  5


Picking up an Object (QC):  6


Does the Pt use WC or Scooter?:  No


Wheel 50 feet with 2 turns (QC:  9


Type:  N/A


Wheel 150 feet:  9


Type:  N/A





PT Plan


Treatment/Plan


Treatment Plan:  Continue Plan of Care


Treatment Plan:  Bed Mobility, Education, Functional Activity [place], Functional 

Strength, Group Therapy, Gait, Safety, Therapeutic Exercise, Transfers


Treatment Duration:  Feb 3, 2021


Frequency:  Modified Program (IRF)


Estimated Hrs Per Day:  Other


Patient and/or Family Agrees t:  Yes





Time/GCodes


Time In:  1102


Time Out:  1202


Total Billed Treatment Time:  60


Total Billed Treatment


1, ex x3 (45), gt (15)











SANTHOSH SILVEIRA PT            [place]atar[place] 15, 2021 12:00

## 2021-01-15 NOTE — OCCUPATIONAL THER DAILY NOTE
OT Current Status-Daily Note


Subjective


Pt agreed to OT tx. Pt mentions not wanting to wear a gait belt because of 

pulling on feeding tube. OT reassures that the gait belt is needed for his 

safety and that she will keep the gait belt under the tube.





Mental Status/Objective


Patient Orientation:  Person, Place, Time, Situation





ADL-Treatment


Therapy Code Descriptions/Definitions 





Functional [place] Measure:


0=Not Assessed/NA        4=Minimal Assistance


1=Total Assistance        5=Supervision or Setup


2=Maximal Assistance  6=Modified [place]


3=Moderate Assistance 7=Complete IndependenceSCALE: Activities may be completed 

with or without assistive devices.





6-Indepedent-patient completes the activity by him/herself with no assistance 

from a helper.


5-Set-up or Clean-up Assistance-helper sets up or cleans up; patient completes 

activity. [place] assists only prior to or  


    following the activity.


4-Supervision or Touching Assistance-helper provides verbal cues and/or touch

ing/steadying and/or contact guard assistance as patient completes activity. 

Assistance may be provided   


    throughout the activity or intermittently.


3-Partial/Moderate Assistance-helper does LESS THAN HALF the effort. [place] 

lifts, holds or supports trunk or limbs, but provides less than half the effort.


2-Substantial/Maximal Assistance-helper does MORE THAN HALF the effort. [place] 

lifts or holds trunk or limbs and provides more than half the effort.


7-Bdslhxiuu-jkfdlv does ALL the effort. Patient does none of the effort to 

complete the activity. Or, the assistance of 2 or more helpers is required for 

the patient to complete the  


    activity.


If activity was not attempted, code reason:


7-Patient Refused.


9-Not Applicable-not attempted and the patient did not perform the activity 

before the current illness, exacerbation or injury.


10-Not Attempted due to Environmental Limitations-(lack of equipment, weather 

restraints, etc.).


88-Not Attempted due to Medical Conditions or Safety Concerns.





Other Treatment


Pt sat edge of bed at the beginning of tx. Pt participated in edge of bed UE 

exercises of the following; PROM BUE shoulder flexion, minimal resistance with 

theraband for biceps curls, shoulder abduction. Pt transferred from bed to w/c 

using FWW, min A. Pt attempted to propel self to therapy gym, but due to limited

activity endurance, OT took pt in w/c to therapy gym. Pt participated in pulley 

exercise to increase ROM of B shoulders, x5 mins. Pt participated in minimal 

resistance putty activity on table to to reduce swelling in L hand, to increase 

fine motor strength BUEs, and to increase activity tolerance. Pt finished 

session with table top activity with peg board to increase activity tolerance 

and hand strength. Pt placed x20 pegs, 1" pegs into foam pegboard, alternating 

hand. Pt needed frequent rest breaks and reminded to take deep breaths in 

through nose and out through mouth. Post tx, pt seated in therapy gym with PT, 

all needs met.





Education


OT Patient Education:  Correct positioning, Energy conservation, Exercise 

program, Modified ADL techniques, Progress toward Goal/Update tx plan, Purpose 

of tx/functional activities, Reviewed precautions, Safety issues, Transfer 

techniques


Teaching Recipient:  Patient


Teaching Methods:  Demonstration, Discussion


Response to Teaching:  Verbalize Understanding





OT Short Term Goals


Short Term Goals


Time Frame:  2021


Eatin


Oral hygiene:  5


Toileting hygiene:  4


Shower/bathe self:  4


Upper body dressin


Lower body dressin


Putting on/taking off footwear:  4





OT Long Term Goals


Long Term Goals


Time Frame:  Feb 10, 2021


Eating (QC):  6


Oral Hygiene (QC):  6


Toileting Hygiene (QC):  6


Shower/Bathe Self (QC):  6


Upper Body Dressing (QC):  6


Lower Body Dressing (QC):  6


On/Off Footwear (QC):  6


Additional Goals:  1-Demonstrate ADL Tasks, 2-Verbalize Understanding, 3-

ImproveStrength/[place]


1=Demonstrate adherence to instructed precautions during ADL tasks.


2=Patient will verbalize/demonstrate understanding of assistive 

devices/modifications for ADL.


3=Patient will improve strength/tolerance for activity to enable patient to 

perform ADL's.





OT Education/Plan


Problem List/Assessment


Assessment:  Decreased Activ Tolerance, Decreased UE Strength, Impaired I ADL's,

Impaired Self-Care Skills, Restricted Funct UE ROM





Discharge Recommendations


Plan/Recommendations:  Continue POC





Treatment Plan/Plan of Care


Patient would benefit from OT for education, treatment and training to promote 

independence in ADL's, mobility, safety and/or upper extremity function for 

ADL's.


Plan of Care:  ADL Retraining, Caregiver Training, Concurrent Therapy, 

Functional Mobility, Group Exercise/Act as Ind, UE Funct Exercise/Act, UE 

Neuromus Re-Ed/Coord


Treatment Duration:  2021


Frequency:  Modified Program (IRF)


Estimated Hrs Per Day:  1 hour per day


Agreement:  Yes


Rehab Potential:  Good





Time/GCodes


Start Time:  10:00


Stop Time:  11:00


Total Time Billed (hr/min):  60


Billed Treatment Time


1, EX (20'), FA 3 (40')











GADIEL MAS OT          [place]atar[place] 15, 2021 11:45

## 2021-01-15 NOTE — PHYSICAL THERAPY DAILY NOTE
PT Daily Note-Current


Subjective


Pt reports significant (B) LE fatigue from the morning therapy.





Mental Status


Patient Orientation:  Person, Place, Time, Situation





Transfers


SCALE: Activities may be completed with or without assistive devices.





6-Indepedent-patient completes the activity by him/herself with no assistance 

from a helper.


5-Set-up or Clean-up Assistance-helper sets up or cleans up; patient completes 

activity. [place] assists only prior to or  


    following the activity.


4-Supervision or Touching Assistance-helper provides verbal cues and/or 

touching/steadying and/or contact guard assistance as patient completes 

activity. Assistance may be provided   


    throughout the activity or intermittently.


3-Partial/Moderate Assistance-helper does LESS THAN HALF the effort. [place] l

ifts, holds or supports trunk or limbs, but provides less than half the effort.


2-Substantial/Maximal Assistance-helper does MORE THAN HALF the effort. [place] 

lifts or holds trunk or limbs and provides more than half the effort.


8-Esnjvdjlx-hjuvgo does ALL the effort. Patient does none of the effort to 

complete the activity. Or, the assistance of 2 or more helpers is required for t

he patient to complete the  


    activity.


If activity was not attempted, code reason:


7-Patient Refused.


9-Not Applicable-not attempted and the patient did not perform the activity 

before the current illness, exacerbation or injury.


10-Not Attempted due to Environmental Limitations-(lack of equipment, weather 

restraints, etc.).


88-Not Attempted due to Medical Conditions or Safety Concerns.


Roll Left & Right (QC):  5





Weight Bearing


Right Lower Extremity:  Right


Full Weight Bearing


Left Lower Extremity:  Left


Full Weight Bearing





Gait Training


Does the Patient Walk?:  No and Walking Goal IS indicated





Exercises


Supine Ex:  LE Protocol


Supine Reps:  20


Performed supine ex with 2# on (R) and 3# on (L).





Assessment


Current Status:  Good Progress


Pt has significant (B) quad weakness.  Difficulty fully extending the knees for 

SAQ with and without wts.





PT Short Term Goals


Short Term Goals


Time Frame:  2021


Roll Left & Right:  6


Sit to lyin


Lying to sitting on side of be:  6


Sit to stand:  6


Chair/bed-to-chair transfer:  6





PT Long Term Goals


Long Term Goals


PT Long Term Goals Time Frame:  Feb 3, 2021


Roll Left & Right (QC):  6


Sit to Lying (QC):  6


Lying-Sitting on Side/Bed(QC):  6


Sit to Stand (QC):  6


Chair/Bed-to-Chair Xfer(QC):  6


Toilet Transfer (QC):  6


Car Transfer (QC):  5


Does the Patient Walk:  No and Walking Goal IS indicated


Walk 10 feet (QC):  6


Walk 50ft with 2 Turns (QC):  6


Walk 150 ft (QC):  6


Walking 10ft on Uneven Surface:  6


1 Step (curb) (QC):  6


4 Steps (QC):  5


12 Steps (QC):  5


Picking up an Object (QC):  6


Does the Pt use WC or Scooter?:  No


Wheel 50 feet with 2 turns (QC:  9


Type:  N/A


Wheel 150 feet:  9


Type:  N/A





PT Plan


Treatment/Plan


Treatment Plan:  Continue Plan of Care


Treatment Plan:  Bed Mobility, Education, Functional Activity [place], Functional 

Strength, Group Therapy, Gait, Safety, Therapeutic Exercise, Transfers


Treatment Duration:  Feb 3, 2021


Frequency:  Modified Program (IRF)


Estimated Hrs Per Day:  Other


Patient and/or Family Agrees t:  Yes





Time/GCodes


Time In:  1545


Time Out:  1615


Total Billed Treatment Time:  30


Total Billed Treatment


1, ex x2











SANTHOSH SILVEIRA PT            [place]atar[place] 15, 2021 16:36

## 2021-01-15 NOTE — PM&R PROGRESS NOTE
Subjective


HPI/CC On Admission


Date Seen by Provider:  [place] 15, 2021


Time Seen by Provider:  10:30


Subjective/Events-last exam


1/15/21:


Pastoral care notified


Family members 3 of them have  within 2 months


Tremors noted


Heels sore


PEG  placed








21:


Wants PEG tube removed


History of placement 20


No issues


Up in chair today


Tachycardia noted and on BB


NO pain





Review of Systems


General:  Fatigue, Malaise





Objective


Exam


Vital Signs





Vital Signs








  Date Time  Temp Pulse Resp B/P (MAP) Pulse Ox O2 Delivery O2 Flow Rate FiO2


 


1/15/21 16:20 37.0 93 16 115/72 (86) 92   


 


1/15/21 08:36      Room Air  





Capillary Refill :


General Appearance:  No Apparent Distress, WD/WN, Anxious, Thin


HEENT:  PERRL/EOMI, Normal ENT Inspection, Pharynx Normal


Neck:  Full Range of Motion, Normal Inspection, Non Tender, Supple, Carotid 

Bruit


Respiratory:  Chest Non Tender, Lungs Clear, No Accessory Muscle Use, No 

Respiratory Distress, Decreased Breath Sounds


Cardiovascular:  Regular Rate, Rhythm, No Edema, No Gallop, No JVD, No Murmur, 

Normal Peripheral Pulses


Gastrointestinal:  Normal Bowel Sounds, No Organomegaly, No Pulsatile Mass, Non 

Tender, Soft


Back:  Normal Inspection, No CVA Tenderness, No Vertebral Tenderness


Extremity:  Normal Capillary Refill, Normal Inspection, Normal Range of Motion, 

Non Tender, No Calf Tenderness, No Pedal Edema


Neurologic/Psychiatric:  Alert, Oriented x3, No Motor/Sensory Deficits, Abnormal

Gait, Depressed Affect, Motor Weakness (generalized all extremities)


Skin:  Normal Color, Warm/Dry


Lymphatic:  No Adenopathy





Results/Procedures


Lab


Patient resulted labs reviewed.





FIM


Transfers


Therapy Code Descriptions/Definitions 





Functional [place] Measure:


0=Not Assessed/NA        4=Minimal Assistance


1=Total Assistance        5=Supervision or Setup


2=Maximal Assistance  6=Modified [place]


3=Moderate Assistance 7=Complete IndependenceSCALE: Activities may be completed 

with or without assistive devices.





6-Indepedent-patient completes the activity by him/herself with no assistance 

from a helper.


5-Set-up or Clean-up Assistance-helper sets up or cleans up; patient completes 

activity. [place] assists only prior to or  


    following the activity.


4-Supervision or Touching Assistance-helper provides verbal cues and/or 

touching/steadying and/or contact guard assistance as patient completes 

activity. Assistance may be provided   


    throughout the activity or intermittently.


3-Partial/Moderate Assistance-helper does LESS THAN HALF the effort. [place] 

lifts, holds or supports trunk or limbs, but provides less than half the effort.


2-Substantial/Maximal Assistance-helper does MORE THAN HALF the effort. [place] 

lifts or holds trunk or limbs and provides more than half the effort.


1-Yurbnhofq-cramth does ALL the effort. Patient does none of the effort to 

complete the activity. Or, the assistance of 2 or more helpers is required for 

the patient to complete the  


    activity.


If activity was not attempted, code reason:


7-Patient Refused.


9-Not Applicable-not attempted and the patient did not perform the activity 

before the current illness, exacerbation or injury.


10-Not Attempted due to Environmental Limitations-(lack of equipment, weather 

restraints, etc.).


88-Not Attempted due to Medical Conditions or Safety Concerns.


Roll Left to Right (QC):  3


Sit to Lying (QC):  3


Sit to Stand (QC):  2 (mod assist to come to a stand and mod assist initially to

gain balacne with skilled cues for upright posture. )


Chair/Bed-to-Chair Xfer(QC):  3 (min assist with assist for the walker and 

skilled cues to sequence. )


Car Transfer (QC):  88





Gait Training


Does the Patient Walk?:  No and Walking Goal IS indicated


Walk 10 feet (QC):  88


Walk 50 ft with 2 Turns(QC):  88


Walk 150 ft (QC):  88


Walking 10ft/uneven surface-QC:  88


Gait Assistive Device:  FWW





Wheelchair Training


Does the Pt Use a Wheelchair?:  No


Wheel 50 ft with 2 turns (QC):  9


Wheel 150 ft (QC):  9


Type of Wheelchair:  N/A





Stair Training


1 Step (curb) (QC):  88


4 Steps (QC):  88


12 Steps (QC):  88





Balance


Picking up an Object (QC):  88





ADL-Treatment


Eating (QC):  4 (Pt. issued weighted utensils to assist with tremors when 

feeding self.)


Oral Hygiene (QC):  4 (SBA, pt required assistance opening package prior to 

task.)


Shower/Bathe Self (QC):  1 (pt able to wash trunk, BUE, and thighs. Assistance 

x2 needed for standing while washing buttocks.)


Upper Body Dressing (QC):  2 (MaxA, pt able to thread arms through shirt, assist

managing up past elbow. Assist threading overhead and adjustment down trunk)


Lower Body Dressing (QC):  1 (Assist x2 in stand for pant hike. Pt required 

assistance threading BLEs into pants.)


On/Off Footwear (QC):  1 (Pt attempted to doff socks but unsuccessful, assist to

doff and don bilateral gripper socks.)


Toileting Hygiene (QC):  2 (Pt. reports that he requires max assist for 

toileting tasks.)





Assessment/Plan


Assessment and Plan


Assess & Plan/Chief Complaint


Assessment:


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


Anemia


s/p trach now DC


Steroid maintenance





Plan:


IRF protocol


Increase ADL independence


Ambulate


O2 monitoring





21:


PEG tube when able to DC will DC


Monitor BP and HR


Monitor O2





1/15/21:


Supportive care


Monitor closely





(1) Myopathy


(2) History of tracheostomy


(3) Frailty


(4) Cough


(5) Anemia


(6) COVID-19











THOR MATHEWS DO                [place] 15, 2021 09:52

## 2021-01-16 VITALS — DIASTOLIC BLOOD PRESSURE: 88 MMHG | SYSTOLIC BLOOD PRESSURE: 133 MMHG

## 2021-01-16 VITALS — DIASTOLIC BLOOD PRESSURE: 73 MMHG | SYSTOLIC BLOOD PRESSURE: 120 MMHG

## 2021-01-16 RX ADMIN — DOCUSATE SODIUM SCH MG: 100 CAPSULE ORAL at 10:43

## 2021-01-16 RX ADMIN — POLYETHYLENE GLYCOL (3350) SCH GM: 17 POWDER, FOR SOLUTION ORAL at 21:30

## 2021-01-16 RX ADMIN — METOPROLOL TARTRATE SCH MG: 50 TABLET, FILM COATED ORAL at 09:55

## 2021-01-16 RX ADMIN — FAMOTIDINE SCH MG: 20 TABLET, FILM COATED ORAL at 09:55

## 2021-01-16 RX ADMIN — DOCUSATE SODIUM AND SENNOSIDES SCH EA: 8.6; 5 TABLET, FILM COATED ORAL at 10:44

## 2021-01-16 RX ADMIN — DOCUSATE SODIUM SCH MG: 100 CAPSULE ORAL at 21:30

## 2021-01-16 RX ADMIN — METOPROLOL TARTRATE SCH MG: 50 TABLET, FILM COATED ORAL at 21:29

## 2021-01-16 RX ADMIN — DOCUSATE SODIUM AND SENNOSIDES SCH EA: 8.6; 5 TABLET, FILM COATED ORAL at 21:30

## 2021-01-16 RX ADMIN — POLYETHYLENE GLYCOL (3350) SCH GM: 17 POWDER, FOR SOLUTION ORAL at 10:43

## 2021-01-16 RX ADMIN — ENOXAPARIN SODIUM SCH MG: 100 INJECTION SUBCUTANEOUS at 09:56

## 2021-01-16 NOTE — PHYSICAL THERAPY DAILY NOTE
PT Daily Note-Current


Subjective


Pt in bed upon arrival; agrees to PT.





Pain





   Numeric Pain Scale:  0-No Pain


   Location:  No Pain Reported





Mental Status


Patient Orientation:  Person, Place, Time, Situation





Transfers


SCALE: Activities may be completed with or without assistive devices.





6-Indepedent-patient completes the activity by him/herself with no assistance 

from a helper.


5-Set-up or Clean-up Assistance-helper sets up or cleans up; patient completes 

activity. [place] assists only prior to or  


    following the activity.


4-Supervision or Touching Assistance-helper provides verbal cues and/or 

touching/steadying and/or contact guard assistance as patient completes 

activity. Assistance may be provided   


    throughout the activity or intermittently.


3-Partial/Moderate Assistance-helper does LESS THAN HALF the effort. [place] 

lifts, holds or supports trunk or limbs, but provides less than half the effort.


2-Substantial/Maximal Assistance-helper does MORE THAN HALF the effort. [place] 

lifts or holds trunk or limbs and provides more than half the effort.


9-Xvrjfbzbx-lffyos does ALL the effort. Patient does none of the effort to 

complete the activity. Or, the assistance of 2 or more helpers is required for 

the patient to complete the  


    activity.


If activity was not attempted, code reason:


7-Patient Refused.


9-Not Applicable-not attempted and the patient did not perform the activity 

before the current illness, exacerbation or injury.


10-Not Attempted due to Environmental Limitations-(lack of equipment, weather 

restraints, etc.).


88-Not Attempted due to Medical Conditions or Safety Concerns.





Weight Bearing


Right Lower Extremity:  Right


Full Weight Bearing


Left Lower Extremity:  Left


Full Weight Bearing





Exercises


Supine Ex:  Ankle pumps, Heel Slides, Short Arc Quads, Straight leg raise, Hip 

abd/add


Supine Reps:  10 (x2)





Treatments


Supine ex completed w/ ankle wts (2lb & 3lb). Pt remains in bed w/ call light 

and bed side table w/in reach and all needs met, at end of tx.





Assessment


Current Status:  Fair Progress


Pt struggles w/ certain supine ex's; needs rest breaks d/t SOB and fatigue.





PT Short Term Goals


Short Term Goals


Time Frame:  2021


Roll Left & Right:  6


Sit to lyin


Lying to sitting on side of be:  6


Sit to stand:  6


Chair/bed-to-chair transfer:  6





PT Long Term Goals


Long Term Goals


PT Long Term Goals Time Frame:  Feb 3, 2021


Roll Left & Right (QC):  6


Sit to Lying (QC):  6


Lying-Sitting on Side/Bed(QC):  6


Sit to Stand (QC):  6


Chair/Bed-to-Chair Xfer(QC):  6


Toilet Transfer (QC):  6


Car Transfer (QC):  5


Does the Patient Walk:  No and Walking Goal IS indicated


Walk 10 feet (QC):  6


Walk 50ft with 2 Turns (QC):  6


Walk 150 ft (QC):  6


Walking 10ft on Uneven Surface:  6


1 Step (curb) (QC):  6


4 Steps (QC):  5


12 Steps (QC):  5


Picking up an Object (QC):  6


Does the Pt use WC or Scooter?:  No


Wheel 50 feet with 2 turns (QC:  9


Type:  N/A


Wheel 150 feet:  9


Type:  N/A





PT Plan


Problem List


Problem List:  Activity Tolerance, Functional Strength, Safety, Balance, Gait, 

Transfer, Bed Mobility, ROM





Treatment/Plan


Treatment Plan:  Continue Plan of Care


Treatment Plan:  Bed Mobility, Education, Functional Activity [place], Functional 

Strength, Group Therapy, Gait, Safety, Therapeutic Exercise, Transfers


Treatment Duration:  Feb 3, 2021


Frequency:  Modified Program (IRF)


Estimated Hrs Per Day:  Other


Patient and/or Family Agrees t:  Yes





Safety Risks/Education


Patient Education:  Correct Positioning


Teaching Recipient:  Patient


Teaching Methods:  Discussion


Response to Teaching:  Verbalize Understanding





Time/GCodes


Time In:  1019


Time Out:  1034


Total Billed Treatment Time:  15


Total Billed Treatment


1, EX(15m)











RAMU PA PTA           2021 12:04

## 2021-01-16 NOTE — PM&R PROGRESS NOTE
Subjective


HPI/CC On Admission


Date Seen by Provider:  2021


Time Seen by Provider:  08:00


Subjective/Events-last exam


21:


Pastoral care visited him and that was helpful given 3 immediate family members 

have  in the past 1 month


Trach and PEG dressing changes


Ale[place] on heels


Gaining strength








1/15/21:


Pastoral care notified


Family members 3 of them have  within 2 months


Tremors noted


Heels sore


PEG  placed








21:


Wants PEG tube removed


History of placement 20


No issues


Up in chair today


Tachycardia noted and on BB


NO pain





Review of Systems


General:  Fatigue, Malaise


Neurological:  Weakness





Objective


Exam


Vital Signs





Vital Signs








  Date Time  Temp Pulse Resp B/P (MAP) Pulse Ox O2 Delivery O2 Flow Rate FiO2


 


21 05:45 36.7 96 17 120/77 (91) 92 Room Air  





Capillary Refill :


General Appearance:  No Apparent Distress, WD/WN, Anxious, Thin


HEENT:  PERRL/EOMI, Normal ENT Inspection, Pharynx Normal


Neck:  Full Range of Motion, Normal Inspection, Non Tender, Supple, Carotid 

Bruit


Respiratory:  Chest Non Tender, Lungs Clear, No Accessory Muscle Use, No 

Respiratory Distress, Decreased Breath Sounds


Cardiovascular:  Regular Rate, Rhythm, No Edema, No Gallop, No JVD, No Murmur, 

Normal Peripheral Pulses


Gastrointestinal:  Normal Bowel Sounds, No Organomegaly, No Pulsatile Mass, Non 

Tender, Soft


Back:  Normal Inspection, No CVA Tenderness, No Vertebral Tenderness


Extremity:  Normal Capillary Refill, Normal Inspection, Normal Range of Motion, 

Non Tender, No Calf Tenderness, No Pedal Edema


Neurologic/Psychiatric:  Alert, Oriented x3, No Motor/Sensory Deficits, Abnormal

Gait, Depressed Affect, Motor Weakness (generalized all extremities)


Skin:  Normal Color, Warm/Dry


Lymphatic:  No Adenopathy





Results/Procedures


Lab


Patient resulted labs reviewed.





FIM


Transfers


Therapy Code Descriptions/Definitions 





Functional [place] Measure:


0=Not Assessed/NA        4=Minimal Assistance


1=Total Assistance        5=Supervision or Setup


2=Maximal Assistance  6=Modified [place]


3=Moderate Assistance 7=Complete IndependenceSCALE: Activities may be completed 

with or without assistive devices.





6-Indepedent-patient completes the activity by him/herself with no assistance 

from a helper.


5-Set-up or Clean-up Assistance-helper sets up or cleans up; patient completes 

activity. [place] assists only prior to or  


    following the activity.


4-Supervision or Touching Assistance-helper provides verbal cues and/or 

touching/steadying and/or contact guard assistance as patient completes 

activity. Assistance may be provided   


    throughout the activity or intermittently.


3-Partial/Moderate Assistance-helper does LESS THAN HALF the effort. [place] 

lifts, holds or supports trunk or limbs, but provides less than half the effort.


2-Substantial/Maximal Assistance-helper does MORE THAN HALF the effort. [place] 

lifts or holds trunk or limbs and provides more than half the effort.


5-Irfwrgcdr-tjescq does ALL the effort. Patient does none of the effort to 

complete the activity. Or, the assistance of 2 or more helpers is required for 

the patient to complete the  


    activity.


If activity was not attempted, code reason:


7-Patient Refused.


9-Not Applicable-not attempted and the patient did not perform the activity 

before the current illness, exacerbation or injury.


10-Not Attempted due to Environmental Limitations-(lack of equipment, weather 

restraints, etc.).


88-Not Attempted due to Medical Conditions or Safety Concerns.


Roll Left to Right (QC):  5


Sit to Lying (QC):  3


Sit to Stand (QC):  2 (mod assist to come to a stand and mod assist initially to

gain balacne with skilled cues for upright posture. )


Chair/Bed-to-Chair Xfer(QC):  3 (min assist with assist for the walker and 

skilled cues to sequence. )


Car Transfer (QC):  88





Gait Training


Does the Patient Walk?:  No and Walking Goal IS indicated


Distance:  3ft


Walk 10 feet (QC):  88


Walk 50 ft with 2 Turns(QC):  88


Walk 150 ft (QC):  88


Walking 10ft/uneven surface-QC:  88


Gait Persons Needed:  1


Gait Assistive Device:  FWW





Wheelchair Training


Does the Pt Use a Wheelchair?:  Yes


Wheel 50 ft with 2 turns (QC):  3


Wheel 150 ft (QC):  9


Type of Wheelchair:  Manual





Stair Training


1 Step (curb) (QC):  88


4 Steps (QC):  88


12 Steps (QC):  88





Balance


Picking up an Object (QC):  88





ADL-Treatment


Eating (QC):  4 (Pt. issued weighted utensils to assist with tremors when 

feeding self.)


Oral Hygiene (QC):  4 (SBA, pt required assistance opening package prior to 

task.)


Shower/Bathe Self (QC):  1 (pt able to wash trunk, BUE, and thighs. Assistance 

x2 needed for standing while washing buttocks.)


Upper Body Dressing (QC):  2 (MaxA, pt able to thread arms through shirt, assist

managing up past elbow. Assist threading overhead and adjustment down trunk)


Lower Body Dressing (QC):  1 (Assist x2 in stand for pant hike. Pt required 

assistance threading BLEs into pants.)


On/Off Footwear (QC):  1 (Pt attempted to doff socks but unsuccessful, assist to

doff and don bilateral gripper socks.)


Toileting Hygiene (QC):  2 (Pt. reports that he requires max assist for 

toileting tasks.)





Assessment/Plan


Assessment and Plan


Assess & Plan/Chief Complaint


Assessment:


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


Anemia


s/p trach now DC


Steroid maintenance





Plan:


IRF protocol


Increase ADL independence


Ambulate


O2 monitoring





21:


PEG tube when able to DC will DC


Monitor BP and HR


Monitor O2





1/15/21:


Supportive care


Monitor closely





21:


Monitor closely


BP stable


Pastoral care visit





(1) Myopathy


(2) History of tracheostomy


(3) Frailty


(4) Cough


(5) Anemia


(6) COVID-19











THOR MATHEWS DO                2021 05:16

## 2021-01-17 VITALS — SYSTOLIC BLOOD PRESSURE: 128 MMHG | DIASTOLIC BLOOD PRESSURE: 75 MMHG

## 2021-01-17 VITALS — DIASTOLIC BLOOD PRESSURE: 77 MMHG | SYSTOLIC BLOOD PRESSURE: 120 MMHG

## 2021-01-17 RX ADMIN — POLYETHYLENE GLYCOL (3350) SCH GM: 17 POWDER, FOR SOLUTION ORAL at 08:36

## 2021-01-17 RX ADMIN — DOCUSATE SODIUM SCH MG: 100 CAPSULE ORAL at 21:00

## 2021-01-17 RX ADMIN — METOPROLOL TARTRATE SCH MG: 50 TABLET, FILM COATED ORAL at 08:32

## 2021-01-17 RX ADMIN — DOCUSATE SODIUM AND SENNOSIDES SCH EA: 8.6; 5 TABLET, FILM COATED ORAL at 08:36

## 2021-01-17 RX ADMIN — DOCUSATE SODIUM AND SENNOSIDES SCH EA: 8.6; 5 TABLET, FILM COATED ORAL at 21:00

## 2021-01-17 RX ADMIN — METOPROLOL TARTRATE SCH MG: 50 TABLET, FILM COATED ORAL at 21:09

## 2021-01-17 RX ADMIN — MIRTAZAPINE SCH MG: 15 TABLET, ORALLY DISINTEGRATING ORAL at 21:09

## 2021-01-17 RX ADMIN — POLYETHYLENE GLYCOL (3350) SCH GM: 17 POWDER, FOR SOLUTION ORAL at 21:00

## 2021-01-17 RX ADMIN — MELATONIN 3 MG ORAL TABLET PRN MG: 3 TABLET ORAL at 21:09

## 2021-01-17 RX ADMIN — DOCUSATE SODIUM SCH MG: 100 CAPSULE ORAL at 08:36

## 2021-01-17 RX ADMIN — FAMOTIDINE SCH MG: 20 TABLET, FILM COATED ORAL at 08:31

## 2021-01-17 RX ADMIN — TRIAMCINOLONE ACETONIDE SCH GM: 5 CREAM TOPICAL at 21:10

## 2021-01-17 RX ADMIN — TRIAMCINOLONE ACETONIDE SCH GM: 5 CREAM TOPICAL at 13:26

## 2021-01-17 RX ADMIN — ENOXAPARIN SODIUM SCH MG: 100 INJECTION SUBCUTANEOUS at 08:34

## 2021-01-17 NOTE — PM&R PROGRESS NOTE
Subjective


HPI/CC On Admission


Date Seen by Provider:  2021


Time Seen by Provider:  12:30


Subjective/Events-last exam


21:


Did not sleep well so willing to try Remeron 15mg and Melatonin


Motivated


Scoliosis noted


Ankle weights provided to help strengthening








21:


Pastoral care visited him and that was helpful given 3 immediate family members 

have  in the past 1 month


Trach and PEG dressing changes


Aleven on heels


Gaining strength








1/15/21:


Pastoral care notified


Family members 3 of them have  within 2 months


Tremors noted


Heels sore


PEG  placed








21:


Wants PEG tube removed


History of placement 20


No issues


Up in chair today


Tachycardia noted and on BB


NO pain





Review of Systems


Neurological:  Weakness, Incoordination





Objective


Exam


Vital Signs





Vital Signs








  Date Time  Temp Pulse Resp B/P (MAP) Pulse Ox O2 Delivery O2 Flow Rate FiO2


 


21 17:40 36.6 97 16 128/75 (92) 93 Room Air  





Capillary Refill :


General Appearance:  No Apparent Distress, WD/WN, Anxious, Thin


HEENT:  PERRL/EOMI, Normal ENT Inspection, Pharynx Normal


Neck:  Full Range of Motion, Normal Inspection, Non Tender, Supple, Carotid 

Bruit


Respiratory:  Chest Non Tender, Lungs Clear, No Accessory Muscle Use, No 

Respiratory Distress, Decreased Breath Sounds


Cardiovascular:  Regular Rate, Rhythm, No Edema, No Gallop, No JVD, No Murmur, 

Normal Peripheral Pulses


Gastrointestinal:  Normal Bowel Sounds, No Organomegaly, No Pulsatile Mass, Non 

Tender, Soft


Back:  Normal Inspection, No CVA Tenderness, No Vertebral Tenderness


Extremity:  Normal Capillary Refill, Normal Inspection, Normal Range of Motion, 

Non Tender, No Calf Tenderness, No Pedal Edema


Neurologic/Psychiatric:  Alert, Oriented x3, No Motor/Sensory Deficits, Abnormal

Gait, Depressed Affect, Motor Weakness (generalized all extremities)


Skin:  Normal Color, Warm/Dry


Lymphatic:  No Adenopathy





Results/Procedures


Lab


Patient resulted labs reviewed.





FIM


Transfers


Therapy Code Descriptions/Definitions 





Functional [place] Measure:


0=Not Assessed/NA        4=Minimal Assistance


1=Total Assistance        5=Supervision or Setup


2=Maximal Assistance  6=Modified [place]


3=Moderate Assistance 7=Complete IndependenceSCALE: Activities may be completed 

with or without assistive devices.





6-Indepedent-patient completes the activity by him/herself with no assistance 

from a helper.


5-Set-up or Clean-up Assistance-helper sets up or cleans up; patient completes 

activity. [place] assists only prior to or  


    following the activity.


4-Supervision or Touching Assistance-helper provides verbal cues and/or 

touching/steadying and/or contact guard assistance as patient completes 

activity. Assistance may be provided   


    throughout the activity or intermittently.


3-Partial/Moderate Assistance-helper does LESS THAN HALF the effort. [place] 

lifts, holds or supports trunk or limbs, but provides less than half the effort.


2-Substantial/Maximal Assistance-helper does MORE THAN HALF the effort. [place] 

lifts or holds trunk or limbs and provides more than half the effort.


9-Jumodynim-deqmxd does ALL the effort. Patient does none of the effort to 

complete the activity. Or, the assistance of 2 or more helpers is required for 

the patient to complete the  


    activity.


If activity was not attempted, code reason:


7-Patient Refused.


9-Not Applicable-not attempted and the patient did not perform the activity 

before the current illness, exacerbation or injury.


10-Not Attempted due to Environmental Limitations-(lack of equipment, weather 

restraints, etc.).


88-Not Attempted due to Medical Conditions or Safety Concerns.


Roll Left to Right (QC):  5


Sit to Lying (QC):  3


Sit to Stand (QC):  2 (mod assist to come to a stand and mod assist initially to

gain balacne with skilled cues for upright posture. )


Chair/Bed-to-Chair Xfer(QC):  3 (min assist with assist for the walker and 

skilled cues to sequence. )


Car Transfer (QC):  88





Gait Training


Does the Patient Walk?:  No and Walking Goal IS indicated


Distance:  3ft


Walk 10 feet (QC):  88


Walk 50 ft with 2 Turns(QC):  88


Walk 150 ft (QC):  88


Walking 10ft/uneven surface-QC:  88


Gait Persons Needed:  1


Gait Assistive Device:  FWW





Wheelchair Training


Does the Pt Use a Wheelchair?:  Yes


Wheel 50 ft with 2 turns (QC):  3


Wheel 150 ft (QC):  9


Type of Wheelchair:  Manual





Stair Training


1 Step (curb) (QC):  88


4 Steps (QC):  88


12 Steps (QC):  88





Balance


Picking up an Object (QC):  88





ADL-Treatment


Eating (QC):  4 (Pt. issued weighted utensils to assist with tremors when 

feeding self.)


Oral Hygiene (QC):  4 (SBA, pt required assistance opening package prior to 

task.)


Shower/Bathe Self (QC):  1 (pt able to wash trunk, BUE, and thighs. Assistance 

x2 needed for standing while washing buttocks.)


Upper Body Dressing (QC):  2 (MaxA, pt able to thread arms through shirt, assist

managing up past elbow. Assist threading overhead and adjustment down trunk)


Lower Body Dressing (QC):  1 (Assist x2 in stand for pant hike. Pt required 

assistance threading BLEs into pants.)


On/Off Footwear (QC):  1 (Pt attempted to doff socks but unsuccessful, assist to

doff and don bilateral gripper socks.)


Toileting Hygiene (QC):  2 (Pt. reports that he requires max assist for 

toileting tasks.)





Assessment/Plan


Assessment and Plan


Assess & Plan/Chief Complaint


Assessment:


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


Anemia


s/p trach now DC


Steroid maintenance





Plan:


IRF protocol


Increase ADL independence


Ambulate


O2 monitoring





21:


PEG tube when able to DC will DC


Monitor BP and HR


Monitor O2





1/15/21:


Supportive care


Monitor closely





21:


Monitor closely


BP stable


Pastoral care visit





21:


Insomnia treatment


Monitor closely


Pain management





(1) Myopathy


(2) History of tracheostomy


(3) Frailty


(4) Cough


(5) Anemia


(6) COVID-19











THOR MATHEWS DO                2021 08:10

## 2021-01-18 VITALS — DIASTOLIC BLOOD PRESSURE: 76 MMHG | SYSTOLIC BLOOD PRESSURE: 118 MMHG

## 2021-01-18 VITALS — DIASTOLIC BLOOD PRESSURE: 77 MMHG | SYSTOLIC BLOOD PRESSURE: 111 MMHG

## 2021-01-18 LAB
ALBUMIN SERPL-MCNC: 3.3 GM/DL (ref 3.2–4.5)
ALP SERPL-CCNC: 82 U/L (ref 40–136)
ALT SERPL-CCNC: 73 U/L (ref 0–55)
BASOPHILS # BLD AUTO: 0.1 10^3/UL (ref 0–0.1)
BASOPHILS NFR BLD AUTO: 1 % (ref 0–10)
BILIRUB SERPL-MCNC: 0.4 MG/DL (ref 0.1–1)
BUN/CREAT SERPL: 9
CALCIUM SERPL-MCNC: 8.6 MG/DL (ref 8.5–10.1)
CHLORIDE SERPL-SCNC: 105 MMOL/L (ref 98–107)
CO2 SERPL-SCNC: 28 MMOL/L (ref 21–32)
CREAT SERPL-MCNC: 0.99 MG/DL (ref 0.6–1.3)
EOSINOPHIL # BLD AUTO: 0.3 10^3/UL (ref 0–0.3)
EOSINOPHIL NFR BLD AUTO: 4 % (ref 0–10)
GFR SERPLBLD BASED ON 1.73 SQ M-ARVRAT: > 60 ML/MIN
GLUCOSE SERPL-MCNC: 88 MG/DL (ref 70–105)
HCT VFR BLD CALC: 34 % (ref 40–54)
HGB BLD-MCNC: 10.6 G/DL (ref 13.3–17.7)
LYMPHOCYTES # BLD AUTO: 2.7 10^3/UL (ref 1–4)
LYMPHOCYTES NFR BLD AUTO: 32 % (ref 12–44)
MANUAL DIFFERENTIAL PERFORMED BLD QL: NO
MCH RBC QN AUTO: 30 PG (ref 25–34)
MCHC RBC AUTO-ENTMCNC: 31 G/DL (ref 32–36)
MCV RBC AUTO: 96 FL (ref 80–99)
MONOCYTES # BLD AUTO: 0.7 10^3/UL (ref 0–1)
MONOCYTES NFR BLD AUTO: 8 % (ref 0–12)
NEUTROPHILS # BLD AUTO: 4.7 10^3/UL (ref 1.8–7.8)
NEUTROPHILS NFR BLD AUTO: 55 % (ref 42–75)
PLATELET # BLD: 190 10^3/UL (ref 130–400)
PMV BLD AUTO: 9.9 FL (ref 9–12.2)
POTASSIUM SERPL-SCNC: 3.8 MMOL/L (ref 3.6–5)
PROT SERPL-MCNC: 6.1 GM/DL (ref 6.4–8.2)
SODIUM SERPL-SCNC: 141 MMOL/L (ref 135–145)
WBC # BLD AUTO: 8.6 10^3/UL (ref 4.3–11)

## 2021-01-18 RX ADMIN — DOCUSATE SODIUM AND SENNOSIDES SCH EA: 8.6; 5 TABLET, FILM COATED ORAL at 20:36

## 2021-01-18 RX ADMIN — PREDNISONE SCH MG: 5 TABLET ORAL at 10:05

## 2021-01-18 RX ADMIN — MIRTAZAPINE SCH MG: 15 TABLET, ORALLY DISINTEGRATING ORAL at 20:46

## 2021-01-18 RX ADMIN — DOCUSATE SODIUM SCH MG: 100 CAPSULE ORAL at 20:36

## 2021-01-18 RX ADMIN — POLYETHYLENE GLYCOL (3350) SCH GM: 17 POWDER, FOR SOLUTION ORAL at 20:36

## 2021-01-18 RX ADMIN — POLYETHYLENE GLYCOL (3350) SCH GM: 17 POWDER, FOR SOLUTION ORAL at 10:02

## 2021-01-18 RX ADMIN — DOCUSATE SODIUM SCH MG: 100 CAPSULE ORAL at 10:02

## 2021-01-18 RX ADMIN — MELATONIN 3 MG ORAL TABLET PRN MG: 3 TABLET ORAL at 20:46

## 2021-01-18 RX ADMIN — ENOXAPARIN SODIUM SCH MG: 100 INJECTION SUBCUTANEOUS at 10:02

## 2021-01-18 RX ADMIN — FAMOTIDINE SCH MG: 20 TABLET, FILM COATED ORAL at 10:02

## 2021-01-18 RX ADMIN — TRIAMCINOLONE ACETONIDE SCH GM: 5 CREAM TOPICAL at 20:46

## 2021-01-18 RX ADMIN — METOPROLOL TARTRATE SCH MG: 50 TABLET, FILM COATED ORAL at 10:02

## 2021-01-18 RX ADMIN — DOCUSATE SODIUM AND SENNOSIDES SCH EA: 8.6; 5 TABLET, FILM COATED ORAL at 10:02

## 2021-01-18 RX ADMIN — TRIAMCINOLONE ACETONIDE SCH GM: 5 CREAM TOPICAL at 10:03

## 2021-01-18 RX ADMIN — METOPROLOL TARTRATE SCH MG: 50 TABLET, FILM COATED ORAL at 20:46

## 2021-01-18 NOTE — PM&R PROGRESS NOTE
Subjective


HPI/CC On Admission


Date Seen by Provider:  2021


Time Seen by Provider:  08:30


Subjective/Events-last exam


21:


Pt slept really well 


Doing well 


No pain 


Getting stronger 


Monitoring closely








21:


Did not sleep well so willing to try Remeron 15mg and Melatonin


Motivated


Scoliosis noted


Ankle weights provided to help strengthening








21:


Pastoral care visited him and that was helpful given 3 immediate family members 

have  in the past 1 month


Trach and PEG dressing changes


Aleven on heels


Gaining strength








1/15/21:


Pastoral care notified


Family members 3 of them have  within 2 months


Tremors noted


Heels sore


PEG  placed








21:


Wants PEG tube removed


History of placement 20


No issues


Up in chair today


Tachycardia noted and on BB


NO pain





Review of Systems


General:  Fatigue, Malaise


Neurological:  Weakness





Objective


Exam


Vital Signs





Vital Signs








  Date Time  Temp Pulse Resp B/P (MAP) Pulse Ox O2 Delivery O2 Flow Rate FiO2


 


21 20:40      Room Air  


 


21 18:36 36.8 91 16 111/77 (88) 94   





Capillary Refill :


General Appearance:  No Apparent Distress, WD/WN, Anxious, Thin


HEENT:  PERRL/EOMI, Normal ENT Inspection, Pharynx Normal


Neck:  Full Range of Motion, Normal Inspection, Non Tender, Supple, Carotid 

Bruit


Respiratory:  Chest Non Tender, Lungs Clear, No Accessory Muscle Use, No 

Respiratory Distress, Decreased Breath Sounds


Cardiovascular:  Regular Rate, Rhythm, No Edema, No Gallop, No JVD, No Murmur, 

Normal Peripheral Pulses


Gastrointestinal:  Normal Bowel Sounds, No Organomegaly, No Pulsatile Mass, Non 

Tender, Soft


Back:  Normal Inspection, No CVA Tenderness, No Vertebral Tenderness


Extremity:  Normal Capillary Refill, Normal Inspection, Normal Range of Motion, 

Non Tender, No Calf Tenderness, No Pedal Edema


Neurologic/Psychiatric:  Alert, Oriented x3, No Motor/Sensory Deficits, Abnormal

Gait, Depressed Affect, Motor Weakness (generalized all extremities)


Skin:  Normal Color, Warm/Dry


Lymphatic:  No Adenopathy





Results/Procedures


Lab


Laboratory Tests


21 06:00








Patient resulted labs reviewed.





FIM


Transfers


Therapy Code Descriptions/Definitions 





Functional [place] Measure:


0=Not Assessed/NA        4=Minimal Assistance


1=Total Assistance        5=Supervision or Setup


2=Maximal Assistance  6=Modified [place]


3=Moderate Assistance 7=Complete IndependenceSCALE: Activities may be completed 

with or without assistive devices.





6-Indepedent-patient completes the activity by him/herself with no assistance 

from a helper.


5-Set-up or Clean-up Assistance-helper sets up or cleans up; patient completes 

activity. [place] assists only prior to or  


    following the activity.


4-Supervision or Touching Assistance-helper provides verbal cues and/or 

touching/steadying and/or contact guard assistance as patient completes ac

tivity. Assistance may be provided   


    throughout the activity or intermittently.


3-Partial/Moderate Assistance-helper does LESS THAN HALF the effort. [place] 

lifts, holds or supports trunk or limbs, but provides less than half the effort.


2-Substantial/Maximal Assistance-helper does MORE THAN HALF the effort. [place] 

lifts or holds trunk or limbs and provides more than half the effort.


9-Mlwkzsttp-xprwwv does ALL the effort. Patient does none of the effort to 

complete the activity. Or, the assistance of 2 or more helpers is required for 

the patient to complete the  


    activity.


If activity was not attempted, code reason:


7-Patient Refused.


9-Not Applicable-not attempted and the patient did not perform the activity 

before the current illness, exacerbation or injury.


10-Not Attempted due to Environmental Limitations-(lack of equipment, weather 

restraints, etc.).


88-Not Attempted due to Medical Conditions or Safety Concerns.


Roll Left to Right (QC):  5


Sit to Lying (QC):  3


Sit to Stand (QC):  2 (mod assist to come to a stand and mod assist initially to

gain balacne with skilled cues for upright posture. )


Chair/Bed-to-Chair Xfer(QC):  3 (min assist with assist for the walker and 

skilled cues to sequence. )


Car Transfer (QC):  88





Gait Training


Does the Patient Walk?:  No and Walking Goal IS indicated


Distance:  3ft


Walk 10 feet (QC):  88


Walk 50 ft with 2 Turns(QC):  88


Walk 150 ft (QC):  88


Walking 10ft/uneven surface-QC:  88


Gait Persons Needed:  1


Gait Assistive Device:  FWW





Wheelchair Training


Does the Pt Use a Wheelchair?:  Yes


Wheel 50 ft with 2 turns (QC):  3


Wheel 150 ft (QC):  9


Type of Wheelchair:  Manual





Stair Training


1 Step (curb) (QC):  88


4 Steps (QC):  88


12 Steps (QC):  88





Balance


Picking up an Object (QC):  88





ADL-Treatment


Eating (QC):  4 (Pt. issued weighted utensils to assist with tremors when 

feeding self.)


Oral Hygiene (QC):  4 (SBA, pt required assistance opening package prior to 

task.)


Shower/Bathe Self (QC):  1 (pt able to wash trunk, BUE, and thighs. Assistance 

x2 needed for standing while washing buttocks.)


Upper Body Dressing (QC):  2 (MaxA, pt able to thread arms through shirt, assist

managing up past elbow. Assist threading overhead and adjustment down trunk)


Lower Body Dressing (QC):  1 (Assist x2 in stand for pant hike. Pt required 

assistance threading BLEs into pants.)


On/Off Footwear (QC):  1 (Pt attempted to doff socks but unsuccessful, assist to

doff and don bilateral gripper socks.)


Toileting Hygiene (QC):  2 (Pt. reports that he requires max assist for 

toileting tasks.)





Assessment/Plan


Assessment and Plan


Assess & Plan/Chief Complaint


Assessment:


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


Anemia


s/p trach now DC


Steroid maintenance





Plan:


IRF protocol


Increase ADL independence


Ambulate


O2 monitoring





21:


PEG tube when able to DC will DC


Monitor BP and HR


Monitor O2





1/15/21:


Supportive care


Monitor closely





21:


Monitor closely


BP stable


Pastoral care visit





21:


Insomnia treatment


Monitor closely


Pain management





21:


Monitor insomnia


Therapy


Wean O2





(1) Myopathy


(2) History of tracheostomy


(3) Frailty


(4) Cough


(5) Anemia


(6) COVID-19











THOR MATHEWS DO                2021 08:47

## 2021-01-18 NOTE — PHYSICAL THERAPY DAILY NOTE
PT Daily Note-Current


Subjective


Pt. agrees to Rx, states x 3 during Rx that he feels SOA. O2 sats monitored 

during Rx.  "I'll do whatever it takes"





Pain





   Numeric Pain Scale:  3


   Location:  Left


   Location Body Site:  Chest (tubes)


   Pain Description:  Burning





Mental Status


Patient Orientation:  Normal For Age


Attachments:  Drains





Transfers


SCALE: Activities may be completed with or without assistive devices.





6-Indepedent-patient completes the activity by him/herself with no assistance 

from a helper.


5-Set-up or Clean-up Assistance-helper sets up or cleans up; patient completes 

activity. [place] assists only prior to or  


    following the activity.


4-Supervision or Touching Assistance-helper provides verbal cues and/or 

touching/steadying and/or contact guard assistance as patient completes 

activity. Assistance may be provided   


    throughout the activity or intermittently.


3-Partial/Moderate Assistance-helper does LESS THAN HALF the effort. [place] 

lifts, holds or supports trunk or limbs, but provides less than half the effort.


2-Substantial/Maximal Assistance-helper does MORE THAN HALF the effort. [place] 

lifts or holds trunk or limbs and provides more than half the effort.


3-Ikbekirqo-tflwnd does ALL the effort. Patient does none of the effort to 

complete the activity. Or, the assistance of 2 or more helpers is required for 

the patient to complete the  


    activity.


If activity was not attempted, code reason:


7-Patient Refused.


9-Not Applicable-not attempted and the patient did not perform the activity 

before the current illness, exacerbation or injury.


10-Not Attempted due to Environmental Limitations-(lack of equipment, weather 

restraints, etc.).


88-Not Attempted due to Medical Conditions or Safety Concerns.


Roll Left & Right (QC):  6


Sit to Lying (QC):  6


Lying to Sitting/Side of Bed(Q:  6


Sit to Stand (QC):  4


Chair/Bed-to-Chair Xfer(QC):  4





Weight Bearing


Right Lower Extremity:  Right


Full Weight Bearing


Left Lower Extremity:  Left


Full Weight Bearing





Gait Training


Does the Patient Walk?:  Yes


Walk 10 feet (QC):  4


Gait Persons Needed:  1


Gait Assistive Device:  FWW


10ft, 4ftx2 FWW slow, small steps, w/c close





Wheelchair Training


Does the Pt Use a Wheelchair?:  Yes


Wheel 50 ft with 2 turns (QC):  5


Type of Wheelchair:  Manual


pt. very fatigued and SOB with w/c mobility





Exercises


Supine Ex:  Bridging, Ankle pumps, Rolling, Glut sets, Heel Slides, Scooting, 

Straight leg raise, Hip abd/add (sup and side)


Supine Reps:  8 (x2)





Treatments


sit to stands, sitting LE exercises, SPTs, short bout of gait, on room air O2 

sats 95% at rest, 82% with activity at one check point, after breathing 

exercises and instruction O2 sats 90% > with activity





Assessment


Current Status:  Good Progress


fatigues and requires rest breaks frequently





PT Short Term Goals


Short Term Goals


Time Frame:  2021


Roll Left & Right:  6


Sit to lyin


Lying to sitting on side of be:  6


Sit to stand:  6


Chair/bed-to-chair transfer:  6





PT Long Term Goals


Long Term Goals


PT Long Term Goals Time Frame:  Feb 3, 2021


Roll Left & Right (QC):  6


Sit to Lying (QC):  6


Lying-Sitting on Side/Bed(QC):  6


Sit to Stand (QC):  6


Chair/Bed-to-Chair Xfer(QC):  6


Toilet Transfer (QC):  6


Car Transfer (QC):  5


Does the Patient Walk:  No and Walking Goal IS indicated


Walk 10 feet (QC):  6


Walk 50ft with 2 Turns (QC):  6


Walk 150 ft (QC):  6


Walking 10ft on Uneven Surface:  6


1 Step (curb) (QC):  6


4 Steps (QC):  5


12 Steps (QC):  5


Picking up an Object (QC):  6


Does the Pt use WC or Scooter?:  No


Wheel 50 feet with 2 turns (QC:  9


Type:  N/A


Wheel 150 feet:  9


Type:  N/A





PT Plan


Treatment/Plan


Treatment Plan:  Continue Plan of Care


Treatment Plan:  Bed Mobility, Education, Functional Activity [place], Functional 

Strength, Group Therapy, Gait, Safety, Therapeutic Exercise, Transfers


Treatment Duration:  Feb 3, 2021


Frequency:  Modified Program (IRF)


Estimated Hrs Per Day:  Other


Patient and/or Family Agrees t:  Yes





Safety Risks/Education


Patient Education:  Gait Training, Transfer Techniques, Correct Positioning, W/C

Management, Disease Process, Safety Issues


Teaching Recipient:  Patient


Teaching Methods:  Demonstration, Discussion


Response to Teaching:  Verbalize Understanding, Return Demonstration, 

Reinforcement Needed





Time/GCodes


Time In:  1100


Time Out:  1200


Total Billed Treatment Time:  60


Total Billed Treatment


1,EX25m,FA35m











CATHERINE PAGE PTA             2021 12:12

## 2021-01-18 NOTE — OCCUPATIONAL THER DAILY NOTE
OT Current Status-Daily Note


Subjective


Pt laying in bed, agreeable to OT tx. Pt declines showering on this date as he 

completed a sponge bath yesterday. Pt agreeable to going to therapy gym.





Mental Status/Objective


Patient Orientation:  Person, Place, Time, Situation





ADL-Treatment


Therapy Code Descriptions/Definitions 





Functional [place] Measure:


0=Not Assessed/NA        4=Minimal Assistance


1=Total Assistance        5=Supervision or Setup


2=Maximal Assistance  6=Modified [place]


3=Moderate Assistance 7=Complete IndependenceSCALE: Activities may be completed 

with or without assistive devices.





6-Indepedent-patient completes the activity by him/herself with no assistance 

from a helper.


5-Set-up or Clean-up Assistance-helper sets up or cleans up; patient completes 

activity. [place] assists only prior to or  


    following the activity.


4-Supervision or Touching Assistance-helper provides verbal cues and/or 

touching/steadying and/or contact guard assistance as patient completes 

activity. Assistance may be provided   


    throughout the activity or intermittently.


3-Partial/Moderate Assistance-helper does LESS THAN HALF the effort. [place] 

lifts, holds or supports trunk or limbs, but provides less than half the effort.


2-Substantial/Maximal Assistance-helper does MORE THAN HALF the effort. [place] 

lifts or holds trunk or limbs and provides more than half the effort.


9-Odpiriums-ihxuat does ALL the effort. Patient does none of the effort to 

complete the activity. Or, the assistance of 2 or more helpers is required for 

the patient to complete the  


    activity.


If activity was not attempted, code reason:


7-Patient Refused.


9-Not Applicable-not attempted and the patient did not perform the activity 

before the current illness, exacerbation or injury.


10-Not Attempted due to Environmental Limitations-(lack of equipment, weather 

restraints, etc.).


88-Not Attempted due to Medical Conditions or Safety Concerns.


Eating (QC):  5 (Assist opening lemonade packet, pouring into container, and 

filling cup with tea. Pt then able to take a drink independently.)


On/Off Footwear:  6 (Assist donning bilateral gripper socks.)





Other Treatment


Pt laying in bed using urinal, agreeable to OT tx. Pt transferred supine to sit 

EOB with SBA. Pt transferred from EOB to w/c, using FWW with CGA. Pt self-

propelled w/c to doorway. OT took pt to therapy gym. In order to increase BUE 

strength and functional endurance and to increase UE ROM, pt completed x5 mins 

on arm pulley. In order to increase fine motor strength, pt completed fine motor

activity, removing x10 beads from moderate resistance red theraputty. Pt then 

completed fine motor task of peg dominoes to increase fine motor coordination. 

Pt taken back to his room, transferring to recliner using FWW, CGA. Pt required 

extensive rest breaks throughout tx. Post tx, pt seated in recliner, call light 

in reach and all needs met.





Education


OT Patient Education:  Correct positioning, Energy conservation, Exercise 

program, Modified ADL techniques, Progress toward Goal/Update tx plan, Purpose 

of tx/functional activities


Teaching Recipient:  Patient


Teaching Methods:  Discussion


Response to Teaching:  Verbalize Understanding, Return Demonstration





OT Short Term Goals


Short Term Goals


Time Frame:  2021


Eatin


Oral hygiene:  5


Toileting hygiene:  4


Shower/bathe self:  4


Upper body dressin


Lower body dressin


Putting on/taking off footwear:  4





OT Long Term Goals


Long Term Goals


Time Frame:  Feb 10, 2021


Eating (QC):  6


Oral Hygiene (QC):  6


Toileting Hygiene (QC):  6


Shower/Bathe Self (QC):  6


Upper Body Dressing (QC):  6


Lower Body Dressing (QC):  6


On/Off Footwear (QC):  6


Additional Goals:  1-Demonstrate ADL Tasks, 2-Verbalize Understanding, 3-Improve

Strength/[place]


1=Demonstrate adherence to instructed precautions during ADL tasks.


2=Patient will verbalize/demonstrate understanding of assistive 

devices/modifications for ADL.


3=Patient will improve strength/tolerance for activity to enable patient to pe

rform ADL's.





OT Education/Plan


Problem List/Assessment


Assessment:  Decreased Activ Tolerance, Decreased UE Strength, Impaired I ADL's,

Impaired Self-Care Skills, Restricted Funct UE ROM





Discharge Recommendations


Plan/Recommendations:  Continue POC





Treatment Plan/Plan of Care


Patient would benefit from OT for education, treatment and training to promote 

independence in ADL's, mobility, safety and/or upper extremity function for 

ADL's.


Plan of Care:  ADL Retraining, Caregiver Training, Concurrent Therapy, Functiona

l Mobility, Group Exercise/Act as Ind, UE Funct Exercise/Act, UE Neuromus Re-

Ed/Coord


Treatment Duration:  2021


Frequency:  Modified Program (IRF)


Estimated Hrs Per Day:  1 hour per day


Agreement:  Yes


Rehab Potential:  Good





Time/GCodes


Start Time:  09:30


Stop Time:  10:30


Total Time Billed (hr/min):  60


Billed Treatment Time


1, EX (10'), FA 3 (45')











GADIEL MAS OT          2021 10:50

## 2021-01-18 NOTE — PHYSICAL THERAPY DAILY NOTE
PT Daily Note-Current


Subjective


Pt. feels he has made progress, wants some exercises he can do in room himself





Pain





   Location:  No Pain Reported





Mental Status


Patient Orientation:  Normal For Age


Attachments:  Drains





Transfers


SCALE: Activities may be completed with or without assistive devices.





6-Indepedent-patient completes the activity by him/herself with no assistance 

from a helper.


5-Set-up or Clean-up Assistance-helper sets up or cleans up; patient completes 

activity. [place] assists only prior to or  


    following the activity.


4-Supervision or Touching Assistance-helper provides verbal cues and/or 

touching/steadying and/or contact guard assistance as patient completes 

activity. Assistance may be provided   


    throughout the activity or intermittently.


3-Partial/Moderate Assistance-helper does LESS THAN HALF the effort. [place] 

lifts, holds or supports trunk or limbs, but provides less than half the effort.


2-Substantial/Maximal Assistance-helper does MORE THAN HALF the effort. [place] 

lifts or holds trunk or limbs and provides more than half the effort.


7-Ucctsxlcj-yxutwc does ALL the effort. Patient does none of the effort to 

complete the activity. Or, the assistance of 2 or more helpers is required for 

the patient to complete the  


    activity.


If activity was not attempted, code reason:


7-Patient Refused.


9-Not Applicable-not attempted and the patient did not perform the activity 

before the current illness, exacerbation or injury.


10-Not Attempted due to Environmental Limitations-(lack of equipment, weather 

restraints, etc.).


88-Not Attempted due to Medical Conditions or Safety Concerns.


sit to stand and sit to supine all CGA





Weight Bearing


Right Lower Extremity:  Right


Full Weight Bearing


Left Lower Extremity:  Left


Full Weight Bearing





Gait Training


Gait Assistive Device:  FWW


6-7 feet chair to bed with FWW CGA slow





Exercises


Supine Ex:  Bridging, Ankle pumps, Quad Set, Straight leg raise


Supine Reps:  10 (x2)





Treatments


TRRF chair , short walk to bed , LE exercises pt. can do in room after Rx at his

leisure ie, Qsets, Gsets, bridges, SLRs





Assessment


Current Status:  Good Progress


steady progress





PT Short Term Goals


Short Term Goals


Time Frame:  2021


Roll Left & Right:  6


Sit to lyin


Lying to sitting on side of be:  6


Sit to stand:  6


Chair/bed-to-chair transfer:  6





PT Long Term Goals


Long Term Goals


PT Long Term Goals Time Frame:  Feb 3, 2021


Roll Left & Right (QC):  6


Sit to Lying (QC):  6


Lying-Sitting on Side/Bed(QC):  6


Sit to Stand (QC):  6


Chair/Bed-to-Chair Xfer(QC):  6


Toilet Transfer (QC):  6


Car Transfer (QC):  5


Does the Patient Walk:  No and Walking Goal IS indicated


Walk 10 feet (QC):  6


Walk 50ft with 2 Turns (QC):  6


Walk 150 ft (QC):  6


Walking 10ft on Uneven Surface:  6


1 Step (curb) (QC):  6


4 Steps (QC):  5


12 Steps (QC):  5


Picking up an Object (QC):  6


Does the Pt use WC or Scooter?:  No


Wheel 50 feet with 2 turns (QC:  9


Type:  N/A


Wheel 150 feet:  9


Type:  N/A





PT Plan


Treatment/Plan


Treatment Plan:  Continue Plan of Care


Treatment Plan:  Bed Mobility, Education, Functional Activity [place], Functional 

Strength, Group Therapy, Gait, Safety, Therapeutic Exercise, Transfers


Treatment Duration:  Feb 3, 2021


Frequency:  Modified Program (IRF)


Estimated Hrs Per Day:  Other


Patient and/or Family Agrees t:  Yes





Safety Risks/Education


Patient Education:  Gait Training, Transfer Techniques, Correct Positioning, 

Disease Process, Safety Issues


Teaching Recipient:  Patient


Teaching Methods:  Demonstration, Discussion


Response to Teaching:  Verbalize Understanding, Return Demonstration, 

Reinforcement Needed





Time/GCodes


Time In:  1335


Time Out:  1400


Total Billed Treatment Time:  25


Total Billed Treatment


1,FA10m,EX15m











CATHERINE PAGE PTA             2021 14:04

## 2021-01-19 VITALS — DIASTOLIC BLOOD PRESSURE: 71 MMHG | SYSTOLIC BLOOD PRESSURE: 123 MMHG

## 2021-01-19 VITALS — DIASTOLIC BLOOD PRESSURE: 73 MMHG | SYSTOLIC BLOOD PRESSURE: 117 MMHG

## 2021-01-19 RX ADMIN — POLYETHYLENE GLYCOL (3350) SCH GM: 17 POWDER, FOR SOLUTION ORAL at 21:09

## 2021-01-19 RX ADMIN — DOCUSATE SODIUM SCH MG: 100 CAPSULE ORAL at 21:09

## 2021-01-19 RX ADMIN — DOCUSATE SODIUM SCH MG: 100 CAPSULE ORAL at 09:00

## 2021-01-19 RX ADMIN — MIRTAZAPINE SCH MG: 15 TABLET, ORALLY DISINTEGRATING ORAL at 21:05

## 2021-01-19 RX ADMIN — METOPROLOL TARTRATE SCH MG: 50 TABLET, FILM COATED ORAL at 21:05

## 2021-01-19 RX ADMIN — TRIAMCINOLONE ACETONIDE SCH GM: 5 CREAM TOPICAL at 21:09

## 2021-01-19 RX ADMIN — PREDNISONE SCH MG: 5 TABLET ORAL at 09:02

## 2021-01-19 RX ADMIN — DOCUSATE SODIUM AND SENNOSIDES SCH EA: 8.6; 5 TABLET, FILM COATED ORAL at 09:00

## 2021-01-19 RX ADMIN — DOCUSATE SODIUM AND SENNOSIDES SCH EA: 8.6; 5 TABLET, FILM COATED ORAL at 21:09

## 2021-01-19 RX ADMIN — MELATONIN 3 MG ORAL TABLET PRN MG: 3 TABLET ORAL at 21:05

## 2021-01-19 RX ADMIN — FAMOTIDINE SCH MG: 20 TABLET, FILM COATED ORAL at 09:02

## 2021-01-19 RX ADMIN — POLYETHYLENE GLYCOL (3350) SCH GM: 17 POWDER, FOR SOLUTION ORAL at 09:00

## 2021-01-19 RX ADMIN — ENOXAPARIN SODIUM SCH MG: 100 INJECTION SUBCUTANEOUS at 09:02

## 2021-01-19 RX ADMIN — TRIAMCINOLONE ACETONIDE SCH GM: 5 CREAM TOPICAL at 09:02

## 2021-01-19 RX ADMIN — METOPROLOL TARTRATE SCH MG: 50 TABLET, FILM COATED ORAL at 09:02

## 2021-01-19 NOTE — PHYSICAL THERAPY DAILY NOTE
PT Daily Note-Current


Subjective


Patient in bed pre tx, agrees to PT, has no complaints of pain.





Appearance


Patient in recliner post tx with nurse call, phone, tray, all needs met.





Mental Status


Patient Orientation:  Person, Place, Situation





Transfers


SCALE: Activities may be completed with or without assistive devices.





6-Indepedent-patient completes the activity by him/herself with no assistance 

from a helper.


5-Set-up or Clean-up Assistance-helper sets up or cleans up; patient completes 

activity. [place] assists only prior to or  


    following the activity.


4-Supervision or Touching Assistance-helper provides verbal cues and/or touching

/steadying and/or contact guard assistance as patient completes activity. 

Assistance may be provided   


    throughout the activity or intermittently.


3-Partial/Moderate Assistance-helper does LESS THAN HALF the effort. [place] 

lifts, holds or supports trunk or limbs, but provides less than half the effort.


2-Substantial/Maximal Assistance-helper does MORE THAN HALF the effort. [place] 

lifts or holds trunk or limbs and provides more than half the effort.


2-Imtyohznd-bhsbyx does ALL the effort. Patient does none of the effort to 

complete the activity. Or, the assistance of 2 or more helpers is required for 

the patient to complete the  


    activity.


If activity was not attempted, code reason:


7-Patient Refused.


9-Not Applicable-not attempted and the patient did not perform the activity 

before the current illness, exacerbation or injury.


10-Not Attempted due to Environmental Limitations-(lack of equipment, weather 

restraints, etc.).


88-Not Attempted due to Medical Conditions or Safety Concerns.


Roll Left & Right (QC):  6


Lying to Sitting/Side of Bed(Q:  6


Sit to Stand (QC):  4


Chair/Bed-to-Chair Xfer(QC):  4





Weight Bearing


Right Lower Extremity:  Right


Full Weight Bearing


Left Lower Extremity:  Left


Full Weight Bearing





Gait Training


Distance:  20'x3


Walk 10 feet (QC):  4


Gait Persons Needed:  1


Gait Assistive Device:  FWW


WC follow, narrow ROBERT, slow ambulation, very SOB afterward





Wheelchair Training


Does the Pt Use a Wheelchair?:  Yes


Wheel 50 ft with 2 turns (QC):  4


Type of Wheelchair:  Manual


120', SBA, many rest breaks due to SOB





Exercises


NuStep Minutes:  15


 NuStep Workload:  3





Treatments


bed mobility and transfers, ambulation, WC mobility, LE strengthening





Assessment


Current Status:  Fair Progress


slowly improving endurance





PT Short Term Goals


Short Term Goals


Time Frame:  2021


Roll Left & Right:  6


Sit to lyin


Lying to sitting on side of be:  6


Sit to stand:  6


Chair/bed-to-chair transfer:  6





PT Long Term Goals


Long Term Goals


PT Long Term Goals Time Frame:  Feb 3, 2021


Roll Left & Right (QC):  6


Sit to Lying (QC):  6


Lying-Sitting on Side/Bed(QC):  6


Sit to Stand (QC):  6


Chair/Bed-to-Chair Xfer(QC):  6


Toilet Transfer (QC):  6


Car Transfer (QC):  5


Does the Patient Walk:  No and Walking Goal IS indicated


Walk 10 feet (QC):  6


Walk 50ft with 2 Turns (QC):  6


Walk 150 ft (QC):  6


Walking 10ft on Uneven Surface:  6


1 Step (curb) (QC):  6


4 Steps (QC):  5


12 Steps (QC):  5


Picking up an Object (QC):  6


Does the Pt use WC or Scooter?:  No


Wheel 50 feet with 2 turns (QC:  9


Type:  N/A


Wheel 150 feet:  9


Type:  N/A





PT Plan


Problem List


Problem List:  Activity Tolerance, Functional Strength, Safety, Balance, Gait, 

Transfer, Bed Mobility, ROM





Treatment/Plan


Treatment Plan:  Continue Plan of Care


Treatment Plan:  Bed Mobility, Education, Functional Activity [place], Functional 

Strength, Group Therapy, Gait, Safety, Therapeutic Exercise, Transfers


Treatment Duration:  Feb 3, 2021


Frequency:  Modified Program (IRF)


Estimated Hrs Per Day:  Other


Patient and/or Family Agrees t:  Yes





Safety Risks/Education


Patient Education:  Gait Training, Transfer Techniques, Correct Positioning, W/C

Management, Safety Issues


Teaching Recipient:  Patient


Teaching Methods:  Demonstration, Discussion


Response to Teaching:  Reinforcement Needed





Time/GCodes


Time In:  0800


Time Out:  0900


Total Billed Treatment Time:  60


Total Billed Treatment


1 visit


GT 30'


EX 15'


FA 15'











LISA WOOD PT                2021 08:53

## 2021-01-19 NOTE — PM&R PROGRESS NOTE
Subjective


HPI/CC On Admission


Date Seen by Provider:  2021


Time Seen by Provider:  08:30


Subjective/Events-last exam


21:


Pt doing pretty well


Slept well


Denies any significant new issues


Participating in therapy








21:


Pt slept really well 


Doing well 


No pain 


Getting stronger 


Monitoring closely








21:


Did not sleep well so willing to try Remeron 15mg and Melatonin


Motivated


Scoliosis noted


Ankle weights provided to help strengthening








21:


Pastoral care visited him and that was helpful given 3 immediate family members 

have  in the past 1 month


Trach and PEG dressing changes


[place] on heels


Gaining strength








1/15/21:


Pastoral care notified


Family members 3 of them have  within 2 months


Tremors noted


Heels sore


PEG  placed








21:


Wants PEG tube removed


History of placement 20


No issues


Up in chair today


Tachycardia noted and on BB


NO pain





Review of Systems


Pulmonary:  Dyspnea


Neurological:  Weakness, Incoordination





Objective


Exam


Vital Signs





Vital Signs








  Date Time  Temp Pulse Resp B/P (MAP) Pulse Ox O2 Delivery O2 Flow Rate FiO2


 


21 21:05      Room Air  


 


21 17:55 36.9 91 18 123/71 [phone]   





Capillary Refill :


General Appearance:  No Apparent Distress, WD/WN, Anxious, Thin


HEENT:  PERRL/EOMI, Normal ENT Inspection, Pharynx Normal


Neck:  Full Range of Motion, Normal Inspection, Non Tender, Supple, Carotid 

Bruit


Respiratory:  Chest Non Tender, Lungs Clear, No Accessory Muscle Use, No 

Respiratory Distress, Decreased Breath Sounds


Cardiovascular:  Regular Rate, Rhythm, No Edema, No Gallop, No JVD, No Murmur, 

Normal Peripheral Pulses


Gastrointestinal:  Normal Bowel Sounds, No Organomegaly, No Pulsatile Mass, Non 

Tender, Soft


Back:  Normal Inspection, No CVA Tenderness, No Vertebral Tenderness


Extremity:  Normal Capillary Refill, Normal Inspection, Normal Range of Motion, 

Non Tender, No Calf Tenderness, No Pedal Edema


Neurologic/Psychiatric:  Alert, Oriented x3, No Motor/Sensory Deficits, Abnormal

Gait, Depressed Affect, Motor Weakness (generalized all extremities)


Skin:  Normal Color, Warm/Dry


Lymphatic:  No Adenopathy





Results/Procedures


Lab


Patient resulted labs reviewed.





FIM


Transfers


Therapy Code Descriptions/Definitions 





Functional [place] Measure:


0=Not Assessed/NA        4=Minimal Assistance


1=Total Assistance        5=Supervision or Setup


2=Maximal Assistance  6=Modified [place]


3=Moderate Assistance 7=Complete IndependenceSCALE: Activities may be completed 

with or without assistive devices.





6-Indepedent-patient completes the activity by him/herself with no assistance 

from a helper.


5-Set-up or Clean-up Assistance-helper sets up or cleans up; patient completes 

activity. [place] assists only prior to or  


    following the activity.


4-Supervision or Touching Assistance-helper provides verbal cues and/or 

touching/steadying and/or contact guard assistance as patient completes 

activity. Assistance may be provided   


    throughout the activity or intermittently.


3-Partial/Moderate Assistance-helper does LESS THAN HALF the effort. [place] 

lifts, holds or supports trunk or limbs, but provides less than half the effort.


2-Substantial/Maximal Assistance-helper does MORE THAN HALF the effort. [place] 

lifts or holds trunk or limbs and provides more than half the effort.


7-Ggexnjgiw-zvtmqd does ALL the effort. Patient does none of the effort to 

complete the activity. Or, the assistance of 2 or more helpers is required for 

the patient to complete the  


    activity.


If activity was not attempted, code reason:


7-Patient Refused.


9-Not Applicable-not attempted and the patient did not perform the activity 

before the current illness, exacerbation or injury.


10-Not Attempted due to Environmental Limitations-(lack of equipment, weather 

restraints, etc.).


88-Not Attempted due to Medical Conditions or Safety Concerns.


Roll Left to Right (QC):  6


Sit to Lying (QC):  6


Sit to Stand (QC):  4


Chair/Bed-to-Chair Xfer(QC):  4


Car Transfer (QC):  88





Gait Training


Does the Patient Walk?:  Yes


Distance:  3ft


Walk 10 feet (QC):  4


Walk 50 ft with 2 Turns(QC):  88


Walk 150 ft (QC):  88


Walking 10ft/uneven surface-QC:  88


Gait Persons Needed:  1


Gait Assistive Device:  FWW





Wheelchair Training


Does the Pt Use a Wheelchair?:  Yes


Wheel 50 ft with 2 turns (QC):  5


Wheel 150 ft (QC):  9


Type of Wheelchair:  Manual





Stair Training


1 Step (curb) (QC):  88


4 Steps (QC):  88


12 Steps (QC):  88





Balance


Picking up an Object (QC):  88





ADL-Treatment


Eating (QC):  5 (Assist opening lemonade packet, pouring into container, and 

filling cup with tea. Pt then able to take a drink independently.)


Oral Hygiene (QC):  4 (SBA, pt required assistance opening package prior to 

task.)


Shower/Bathe Self (QC):  1 (pt able to wash trunk, BUE, and thighs. Assistance 

x2 needed for standing while washing buttocks.)


Upper Body Dressing (QC):  2 (MaxA, pt able to thread arms through shirt, assist

managing up past elbow. Assist threading overhead and adjustment down trunk)


Lower Body Dressing (QC):  1 (Assist x2 in stand for pant hike. Pt required 

assistance threading BLEs into pants.)


On/Off Footwear (QC):  6 (Assist donning bilateral gripper socks.)


Toileting Hygiene (QC):  2 (Pt. reports that he requires max assist for 

toileting tasks.)





Assessment/Plan


Assessment and Plan


Assess & Plan/Chief Complaint


Assessment:


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


Anemia


s/p trach now DC


Steroid maintenance





Plan:


IRF protocol


Increase ADL independence


Ambulate


O2 monitoring





21:


PEG tube when able to DC will DC


Monitor BP and HR


Monitor O2





1/15/21:


Supportive care


Monitor closely





21:


Monitor closely


BP stable


Pastoral care visit





21:


Insomnia treatment


Monitor closely


Pain management





21:


Monitor insomnia


Therapy


Wean O2





21:


Monitor O2 during exertion


Insomnia treatment


Remeron also helps depression





(1) Myopathy


(2) History of tracheostomy


(3) Frailty


(4) Cough


(5) Anemia


(6) COVID-19











THOR MATHEWS DO                2021 08:46

## 2021-01-19 NOTE — OCCUPATIONAL THER DAILY NOTE
OT Current Status-Daily Note


Subjective


Pt agreed to OT tx. Pt mentioned pain in stomach area and lower back pain at the

end of tx, does not provide pain rating.





Mental Status/Objective


Patient Orientation:  Person, Place, Time, Situation





ADL-Treatment


Therapy Code Descriptions/Definitions 





Functional [place] Measure:


0=Not Assessed/NA        4=Minimal Assistance


1=Total Assistance        5=Supervision or Setup


2=Maximal Assistance  6=Modified [place]


3=Moderate Assistance 7=Complete IndependenceSCALE: Activities may be completed 

with or without assistive devices.





6-Indepedent-patient completes the activity by him/herself with no assistance 

from a helper.


5-Set-up or Clean-up Assistance-helper sets up or cleans up; patient completes 

activity. [place] assists only prior to or  


    following the activity.


4-Supervision or Touching Assistance-helper provides verbal cues and/or 

touching/steadying and/or contact guard assistance as patient completes 

activity. Assistance may be provided   


    throughout the activity or intermittently.


3-Partial/Moderate Assistance-helper does LESS THAN HALF the effort. [place] 

lifts, holds or supports trunk or limbs, but provides less than half the effort.


2-Substantial/Maximal Assistance-helper does MORE THAN HALF the effort. [place] 

lifts or holds trunk or limbs and provides more than half the effort.


7-Xjwdobqzq-vvqbbd does ALL the effort. Patient does none of the effort to 

complete the activity. Or, the assistance of 2 or more helpers is required for 

the patient to complete the  


    activity.


If activity was not attempted, code reason:


7-Patient Refused.


9-Not Applicable-not attempted and the patient did not perform the activity 

before the current illness, exacerbation or injury.


10-Not Attempted due to Environmental Limitations-(lack of equipment, weather 

restraints, etc.).


88-Not Attempted due to Medical Conditions or Safety Concerns.


Oral Hygiene (QC):  5 (Set up sitting edge of bed)


Shower/Bathe Self (QC):  4 (Pt able to reach all areas for sponge bath, CGA for 

standing to wash buttocks)


Upper Body Dressing (QC):  3 (Rick, assistance needed to pull shirt overhead due

to weakness in BUE. Pt able to thread arms through shirt and adjust over trunk)


Lower Body Dressing (QC):  4 (CGA, pt able to thread pants and perform pants 

hike. CGA needed while standing)





Other Treatment


Seated in recliner, pt participated in sponge bath. Pt able to reach all body 

parts and doffed/donned shirt at [place] and pants at [facility] for standing. Pt 

transferred from recliner to EOB to perform oral care at set up. Pt performed UE

exercises of the following; R shoulder flexion, L passive shoulder flexion, with

minimal resistance B bicep curls and triceps. Pt participated in putty fine 

motor activity at medium resistance and graded clothes pin activity (1-5lb) to 

work on activity tolerance and hand strengthening. pt transferred sit to supine 

independently, with call light in reach and all needs met.





Education


OT Patient Education:  Correct positioning, Exercise program, Modified ADL 

techniques, Progress toward Goal/Update tx plan, Purpose of tx/functional 

activities, Reviewed precautions, Safety issues


Teaching Recipient:  Patient


Teaching Methods:  Demonstration


Response to Teaching:  Verbalize Understanding





OT Short Term Goals


Short Term Goals


Time Frame:  2021


Eatin


Oral hygiene:  5


Toileting hygiene:  4


Shower/bathe self:  4


Upper body dressin


Lower body dressin


Putting on/taking off footwear:  4





OT Long Term Goals


Long Term Goals


Time Frame:  Feb 10, 2021


Eating (QC):  6


Oral Hygiene (QC):  6


Toileting Hygiene (QC):  6


Shower/Bathe Self (QC):  6


Upper Body Dressing (QC):  6


Lower Body Dressing (QC):  6


On/Off Footwear (QC):  6


Additional Goals:  1-Demonstrate ADL Tasks, 2-Verbalize Understanding, 3-

ImproveStrength/[place]


1=Demonstrate adherence to instructed precautions during ADL tasks.


2=Patient will verbalize/demonstrate understanding of assistive devices/

modifications for ADL.


3=Patient will improve strength/tolerance for activity to enable patient to 

perform ADL's.





OT Education/Plan


Problem List/Assessment


Assessment:  Decreased Activ Tolerance, Decreased UE Strength, Impaired Funct 

Balance, Impaired I ADL's, Impaired Self-Care Skills, Restricted Funct UE ROM





Discharge Recommendations


Plan/Recommendations:  Continue POC





Treatment Plan/Plan of Care


Patient would benefit from OT for education, treatment and training to promote 

independence in ADL's, mobility, safety and/or upper extremity function for 

ADL's.


Plan of Care:  ADL Retraining, Caregiver Training, Concurrent Therapy, 

Functional Mobility, Group Exercise/Act as Ind, UE Funct Exercise/Act, UE 

Neuromus Re-Ed/Coord


Treatment Duration:  2021


Frequency:  Modified Program (IRF)


Estimated Hrs Per Day:  1 hour per day


Agreement:  Yes


Rehab Potential:  Good





Time/GCodes


Start Time:  09:00


Stop Time:  10:00


Total Time Billed (hr/min):  60


Billed Treatment Time


1, ADL 2 (30'), FA (20'), EX (10')











GADIEL MSA OT          2021 10:00

## 2021-01-20 VITALS — SYSTOLIC BLOOD PRESSURE: 113 MMHG | DIASTOLIC BLOOD PRESSURE: 61 MMHG

## 2021-01-20 VITALS — DIASTOLIC BLOOD PRESSURE: 71 MMHG | SYSTOLIC BLOOD PRESSURE: 134 MMHG

## 2021-01-20 VITALS — DIASTOLIC BLOOD PRESSURE: 79 MMHG | SYSTOLIC BLOOD PRESSURE: 116 MMHG

## 2021-01-20 RX ADMIN — DOCUSATE SODIUM AND SENNOSIDES SCH EA: 8.6; 5 TABLET, FILM COATED ORAL at 20:20

## 2021-01-20 RX ADMIN — MELATONIN 3 MG ORAL TABLET PRN MG: 3 TABLET ORAL at 20:25

## 2021-01-20 RX ADMIN — DOCUSATE SODIUM SCH MG: 100 CAPSULE ORAL at 20:20

## 2021-01-20 RX ADMIN — DOCUSATE SODIUM SCH MG: 100 CAPSULE ORAL at 09:21

## 2021-01-20 RX ADMIN — POLYETHYLENE GLYCOL (3350) SCH GM: 17 POWDER, FOR SOLUTION ORAL at 09:22

## 2021-01-20 RX ADMIN — DOCUSATE SODIUM AND SENNOSIDES SCH EA: 8.6; 5 TABLET, FILM COATED ORAL at 09:22

## 2021-01-20 RX ADMIN — ENOXAPARIN SODIUM SCH MG: 100 INJECTION SUBCUTANEOUS at 09:16

## 2021-01-20 RX ADMIN — FAMOTIDINE SCH MG: 20 TABLET, FILM COATED ORAL at 09:16

## 2021-01-20 RX ADMIN — METOPROLOL TARTRATE SCH MG: 50 TABLET, FILM COATED ORAL at 20:22

## 2021-01-20 RX ADMIN — TRIAMCINOLONE ACETONIDE SCH GM: 5 CREAM TOPICAL at 20:22

## 2021-01-20 RX ADMIN — METOPROLOL TARTRATE SCH MG: 50 TABLET, FILM COATED ORAL at 09:16

## 2021-01-20 RX ADMIN — MIRTAZAPINE SCH MG: 15 TABLET, ORALLY DISINTEGRATING ORAL at 20:25

## 2021-01-20 RX ADMIN — TRIAMCINOLONE ACETONIDE SCH GM: 5 CREAM TOPICAL at 09:23

## 2021-01-20 RX ADMIN — POLYETHYLENE GLYCOL (3350) SCH GM: 17 POWDER, FOR SOLUTION ORAL at 20:20

## 2021-01-20 RX ADMIN — PREDNISONE SCH MG: 5 TABLET ORAL at 09:16

## 2021-01-20 NOTE — OCCUPATIONAL THER DAILY NOTE
OT Current Status-Daily Note


Subjective


Pt laying in bed sleeping, easily awoken and agreeable to OT tx. Pt reports pain

in his back 4/10.





Mental Status/Objective


Patient Orientation:  Person, Place, Time, Situation





ADL-Treatment


Therapy Code Descriptions/Definitions 





Functional [place] Measure:


0=Not Assessed/NA        4=Minimal Assistance


1=Total Assistance        5=Supervision or Setup


2=Maximal Assistance  6=Modified [place]


3=Moderate Assistance 7=Complete IndependenceSCALE: Activities may be completed 

with or without assistive devices.





6-Indepedent-patient completes the activity by him/herself with no assistance 

from a helper.


5-Set-up or Clean-up Assistance-helper sets up or cleans up; patient completes 

activity. [place] assists only prior to or  


    following the activity.


4-Supervision or Touching Assistance-helper provides verbal cues and/or 

touching/steadying and/or contact guard assistance as patient completes 

activity. Assistance may be provided   


    throughout the activity or intermittently.


3-Partial/Moderate Assistance-helper does LESS THAN HALF the effort. [place] 

lifts, holds or supports trunk or limbs, but provides less than half the effort.


2-Substantial/Maximal Assistance-helper does MORE THAN HALF the effort. [place] 

lifts or holds trunk or limbs and provides more than half the effort.


1-Ezbulffbq-fzbfkn does ALL the effort. Patient does none of the effort to 

complete the activity. Or, the assistance of 2 or more helpers is required for 

the patient to complete the  


    activity.


If activity was not attempted, code reason:


7-Patient Refused.


9-Not Applicable-not attempted and the patient did not perform the activity 

before the current illness, exacerbation or injury.


10-Not Attempted due to Environmental Limitations-(lack of equipment, weather 

restraints, etc.).


88-Not Attempted due to Medical Conditions or Safety Concerns.


Toileting Hygiene (QC):  2 (Pt able to manage clothing down, assist with hygiene

and pant hike.)


Toilet Transfer (QC):  4 (CGA on/off BSC.)





Other Treatment


Pt laying in bed, transferred supine to sit EOB with SBA. Pt then stood at FWW 

and took a few steps with SBA, pt had LOB requiring him to sit abruptly into 

w/c. Pt taken to therapy gym. In order to increase BUE Strength and endurance, 

and UE ROM, pt completed pulleys x5 mins, with a rest break after. Pt then 

completed fine motor task, removing beads from moderate resistance red 

theraputty in order to increase fine motor strength/coordination. Pt also 

completed pegboard, placing 1" pegs into foam pegboard to increase activity 

tolerance and fine motor strength. Pt able to place x40 pegs, alternating hands.

Pt required rest breaks between each activity. Pt states need to have BM, OT 

took pt back to his room, transferred to [facility] using FWW with CGA. Pt completed 

toileting, then used FWW to transfer to EOB, CGA. Pt transferred supine to sit 

EOB, SBA. Post tx, pt laying in bed, call light in reach and all needs met.





Education


OT Patient Education:  Correct positioning, Modified ADL techniques, Progress 

toward Goal/Update tx plan, Purpose of tx/functional activities


Teaching Recipient:  Patient


Teaching Methods:  Discussion


Response to Teaching:  Verbalize Understanding





OT Short Term Goals


Short Term Goals


Time Frame:  2021


Eatin


Oral hygiene:  5


Toileting hygiene:  4


Shower/bathe self:  4


Upper body dressin


Lower body dressin


Putting on/taking off footwear:  4





OT Long Term Goals


Long Term Goals


Time Frame:  Feb 10, 2021


Eating (QC):  6


Oral Hygiene (QC):  6


Toileting Hygiene (QC):  6


Shower/Bathe Self (QC):  6


Upper Body Dressing (QC):  6


Lower Body Dressing (QC):  6


On/Off Footwear (QC):  6


Additional Goals:  1-Demonstrate ADL Tasks, 2-Verbalize Understanding, 3-Im

proveStrength/[place]


1=Demonstrate adherence to instructed precautions during ADL tasks.


2=Patient will verbalize/demonstrate understanding of assistive 

devices/modifications for ADL.


3=Patient will improve strength/tolerance for activity to enable patient to 

perform ADL's.





OT Education/Plan


Problem List/Assessment


Assessment:  Decreased Activ Tolerance, Decreased UE Strength, Impaired Funct 

Balance, Impaired I ADL's, Impaired Self-Care Skills





Discharge Recommendations


Plan/Recommendations:  Continue POC





Treatment Plan/Plan of Care


Patient would benefit from OT for education, treatment and training to promote 

independence in ADL's, mobility, safety and/or upper extremity function for 

ADL's.


Plan of Care:  ADL Retraining, Caregiver Training, Concurrent Therapy, Funct

ional Mobility, Group Exercise/Act as Ind, UE Funct Exercise/Act, UE Neuromus 

Re-Ed/Coord


Treatment Duration:  2021


Frequency:  Modified Program (IRF)


Estimated Hrs Per Day:  1 hour per day


Agreement:  Yes


Rehab Potential:  Good





Time/GCodes


Start Time:  08:00


Stop Time:  09:00


Total Time Billed (hr/min):  60


Billed Treatment Time


1, EX (10'), FA 2 (35'), ADL (15')











GADIEL MAS OT          2021 08:33

## 2021-01-20 NOTE — NUR
CM/SS PATIENT CARE CONFERENCE

Presented Summary for patient to read at his leisure.  Patient indicated no questions and he 
had signed document, charted.



Patient is in agreement for continued stay for rehabilitation.  Lengthy hospitalization 
since 11/23 and with high acuity and severe debility.  Making progress, patient very pleased 
and encouraged.

## 2021-01-20 NOTE — PM&R PROGRESS NOTE
Subjective


HPI/CC On Admission


Date Seen by Provider:  2021


Time Seen by Provider:  09:30


Subjective/Events-last exam


21:


Bowels are moving 


Doing very well 


Dramatic improvement walking 


Overall no significant issues








21:


Pt doing pretty well


Slept well


Denies any significant new issues


Participating in therapy








21:


Pt slept really well 


Doing well 


No pain 


Getting stronger 


Monitoring closely








21:


Did not sleep well so willing to try Remeron 15mg and Melatonin


Motivated


Scoliosis noted


Ankle weights provided to help strengthening








21:


Pastoral care visited him and that was helpful given 3 immediate family members 

have  in the past 1 month


Trach and PEG dressing changes


[place] on heels


Gaining strength








1/15/21:


Pastoral care notified


Family members 3 of them have  within 2 months


Tremors noted


Heels sore


PEG  placed








21:


Wants PEG tube removed


History of placement 20


No issues


Up in chair today


Tachycardia noted and on BB


NO pain





Review of Systems


General:  Fatigue


Pulmonary:  Dyspnea, Cough


Neurological:  Weakness





Objective


Exam


Vital Signs





Vital Signs








  Date Time  Temp Pulse Resp B/P (MAP) Pulse Ox O2 Delivery O2 Flow Rate FiO2


 


21 20:30     94 Room Air  


 


21 20:17  99  113/61 (78)    


 


21 18:00 36.4  18     





Capillary Refill :


General Appearance:  No Apparent Distress, WD/WN, Anxious, Thin


HEENT:  PERRL/EOMI, Normal ENT Inspection, Pharynx Normal


Neck:  Full Range of Motion, Normal Inspection, Non Tender, Supple, Carotid 

Bruit


Respiratory:  Chest Non Tender, Lungs Clear, No Accessory Muscle Use, No Res

piratory Distress, Decreased Breath Sounds


Cardiovascular:  Regular Rate, Rhythm, No Edema, No Gallop, No JVD, No Murmur, 

Normal Peripheral Pulses


Gastrointestinal:  Normal Bowel Sounds, No Organomegaly, No Pulsatile Mass, Non 

Tender, Soft


Back:  Normal Inspection, No CVA Tenderness, No Vertebral Tenderness


Extremity:  Normal Capillary Refill, Normal Inspection, Normal Range of Motion, 

Non Tender, No Calf Tenderness, No Pedal Edema


Neurologic/Psychiatric:  Alert, Oriented x3, No Motor/Sensory Deficits, Abnormal

Gait, Depressed Affect, Motor Weakness (generalized all extremities)


Skin:  Normal Color, Warm/Dry


Lymphatic:  No Adenopathy





Results/Procedures


Lab


Patient resulted labs reviewed.





FIM


Transfers


Therapy Code Descriptions/Definitions 





Functional [place] Measure:


0=Not Assessed/NA        4=Minimal Assistance


1=Total Assistance        5=Supervision or Setup


2=Maximal Assistance  6=Modified [place]


3=Moderate Assistance 7=Complete IndependenceSCALE: Activities may be completed 

with or without assistive devices.





6-Indepedent-patient completes the activity by him/herself with no assistance f

rom a helper.


5-Set-up or Clean-up Assistance-helper sets up or cleans up; patient completes 

activity. [place] assists only prior to or  


    following the activity.


4-Supervision or Touching Assistance-helper provides verbal cues and/or 

touching/steadying and/or contact guard assistance as patient completes 

activity. Assistance may be provided   


    throughout the activity or intermittently.


3-Partial/Moderate Assistance-helper does LESS THAN HALF the effort. [place] 

lifts, holds or supports trunk or limbs, but provides less than half the effort.


2-Substantial/Maximal Assistance-helper does MORE THAN HALF the effort. [place] 

lifts or holds trunk or limbs and provides more than half the effort.


9-Gqcbiunnq-aavowx does ALL the effort. Patient does none of the effort to 

complete the activity. Or, the assistance of 2 or more helpers is required for 

the patient to complete the  


    activity.


If activity was not attempted, code reason:


7-Patient Refused.


9-Not Applicable-not attempted and the patient did not perform the activity 

before the current illness, exacerbation or injury.


10-Not Attempted due to Environmental Limitations-(lack of equipment, weather 

restraints, etc.).


88-Not Attempted due to Medical Conditions or Safety Concerns.


Roll Left to Right (QC):  6


Sit to Lying (QC):  6


Sit to Stand (QC):  4


Chair/Bed-to-Chair Xfer(QC):  4


Car Transfer (QC):  88





Gait Training


Distance:  20'x3


Walk 10 feet (QC):  4


Walk 50 ft with 2 Turns(QC):  88


Walk 150 ft (QC):  88


Walking 10ft/uneven surface-QC:  88


Gait Persons Needed:  1


Gait Assistive Device:  FWW





Wheelchair Training


Wheel 50 ft with 2 turns (QC):  4


Wheel 150 ft (QC):  9





Stair Training


1 Step (curb) (QC):  88


4 Steps (QC):  88


12 Steps (QC):  88





Balance


Picking up an Object (QC):  88





ADL-Treatment


Eating (QC):  5 (Assist opening lemonade packet, pouring into container, and 

filling cup with tea. Pt then able to take a drink independently.)


Oral Hygiene (QC):  5 (Set up sitting edge of bed)


Shower/Bathe Self (QC):  4 (Pt able to reach all areas for sponge bath, CGA for 

standing to wash buttocks)


Upper Body Dressing (QC):  3 ([place], assistance needed to pull shirt overhead due

to weakness in BUE. Pt able to thread arms through shirt and adjust over trunk)


Lower Body Dressing (QC):  4 (CGA, pt able to thread pants and perform pants 

hike. CGA needed while standing)


On/Off Footwear (QC):  6 (Assist donning bilateral gripper socks.)


Toileting Hygiene (QC):  2 (Pt able to manage clothing down, assist with hygiene

and pant hike.)


Toilet Transfer (QC):  4 (CGA on/off BSC.)





Assessment/Plan


Assessment and Plan


Assess & Plan/Chief Complaint


Assessment:


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


Anemia


s/p trach now DC


Steroid maintenance





Plan:


IRF protocol


Increase ADL independence


Ambulate


O2 monitoring





21:


PEG tube when able to DC will DC


Monitor BP and HR


Monitor O2





1/15/21:


Supportive care


Monitor closely





21:


Monitor closely


BP stable


Pastoral care visit





21:


Insomnia treatment


Monitor closely


Pain management





21:


Monitor insomnia


Therapy


Wean O2





21:


Monitor O2 during exertion


Insomnia treatment


Remeron also helps depression





21:


Monitor insomnia


Continue treatment


Declines pain meds for scoliosis





(1) Myopathy


(2) History of tracheostomy


(3) Frailty


(4) Cough


(5) Anemia


(6) COVID-19











THOR MATHEWS DO                2021 10:38

## 2021-01-20 NOTE — PHYSICAL THERAPY DAILY NOTE
PT Daily Note-Current


Subjective


Pt laying supine in bed upon arrival.  Pt agrees to PT.





Pain





   Location:  No Pain Reported





Mental Status


Patient Orientation:  Person, Place, Time, Situation





Transfers


SCALE: Activities may be completed with or without assistive devices.





6-Indepedent-patient completes the activity by him/herself with no assistance 

from a helper.


5-Set-up or Clean-up Assistance-helper sets up or cleans up; patient completes 

activity. [place] assists only prior to or  


    following the activity.


4-Supervision or Touching Assistance-helper provides verbal cues and/or 

touching/steadying and/or contact guard assistance as patient completes 

activity. Assistance may be provided   


    throughout the activity or intermittently.


3-Partial/Moderate Assistance-helper does LESS THAN HALF the effort. [place] 

lifts, holds or supports trunk or limbs, but provides less than half the effort.


2-Substantial/Maximal Assistance-helper does MORE THAN HALF the effort. [place] 

lifts or holds trunk or limbs and provides more than half the effort.


2-Szmrgtcuo-mbdkbo does ALL the effort. Patient does none of the effort to 

complete the activity. Or, the assistance of 2 or more helpers is required for 

the patient to complete the  


    activity.


If activity was not attempted, code reason:


7-Patient Refused.


9-Not Applicable-not attempted and the patient did not perform the activity be

fore the current illness, exacerbation or injury.


10-Not Attempted due to Environmental Limitations-(lack of equipment, weather 

restraints, etc.).


88-Not Attempted due to Medical Conditions or Safety Concerns.


Sit to Lying (QC):  6


Lying to Sitting/Side of Bed(Q:  6


Sit to Stand (QC):  5





Weight Bearing


Right Lower Extremity:  Right


Full Weight Bearing


Left Lower Extremity:  Left


Full Weight Bearing





Gait Training


Does the Patient Walk?:  Yes


Distance:  150'


Walk 10 feet (QC):  4


Walk 50 ft with 2 Turns(QC):  4


Walk 150 ft (QC):  4


Gait Persons Needed:  1


Gait Assistive Device:  FWW


[facility] following for fatigue.





Wheelchair Training


Does the Pt Use a Wheelchair?:  Yes


Wheel 50 ft with 2 turns (QC):  5


Wheel 150 ft (QC):  5


Type of Wheelchair:  Manual





Exercises


Standing:  Hamstring curls, Heel/toe raises, Marching, Weight shifts


Standing Reps:  10 (Rest break between each)


NuStep Minutes:  10


 NuStep Workload:  5





Treatments


TF from Supine to EOB to standing.  Amb. in hallway using FWW  then takes RB.  

Uses NuStep followed by Standing EX at //bar.  Due to fatigue, pt propels WCH 

back to room instead of walking.  TF back to Supine in bed in anticipation of 

lunch.  All needs met, call light in hand.





Assessment


Current Status:  Good Progress


Pt has gained strength and is improving mobility although still fatiguing 

needing occasional RB.





PT Short Term Goals


Short Term Goals


Time Frame:  2021


Roll Left & Right:  6


Sit to lyin


Lying to sitting on side of be:  6


Sit to stand:  6


Chair/bed-to-chair transfer:  6





PT Long Term Goals


Long Term Goals


PT Long Term Goals Time Frame:  Feb 3, 2021


Roll Left & Right (QC):  6


Sit to Lying (QC):  6


Lying-Sitting on Side/Bed(QC):  6


Sit to Stand (QC):  6


Chair/Bed-to-Chair Xfer(QC):  6


Toilet Transfer (QC):  6


Car Transfer (QC):  5


Does the Patient Walk:  No and Walking Goal IS indicated


Walk 10 feet (QC):  6


Walk 50ft with 2 Turns (QC):  6


Walk 150 ft (QC):  6


Walking 10ft on Uneven Surface:  6


1 Step (curb) (QC):  6


4 Steps (QC):  5


12 Steps (QC):  5


Picking up an Object (QC):  6


Does the Pt use WC or Scooter?:  No


Wheel 50 feet with 2 turns (QC:  9


Type:  N/A


Wheel 150 feet:  9


Type:  N/A





PT Plan


Problem List


Problem List:  Activity Tolerance, Functional Strength





Treatment/Plan


Treatment Plan:  Continue Plan of Care


Treatment Plan:  Bed Mobility, Education, Functional Activity [place], Functional 

Strength, Group Therapy, Gait, Safety, Therapeutic Exercise, Transfers


Treatment Duration:  Feb 3, 2021


Frequency:  Modified Program (IRF)


Estimated Hrs Per Day:  Other


Patient and/or Family Agrees t:  Yes





Safety Risks/Education


Patient Education:  Correct Positioning, Safety Issues


Teaching Recipient:  Patient


Teaching Methods:  Discussion


Response to Teaching:  Verbalize Understanding





Time/GCodes


Time In:  1100


Time Out:  1200


Total Billed Treatment Time:  60


Total Billed Treatment


1, GT (15m), WCH (15m) & EX x2 (30m)











YAEL GALVAN PTA              2021 12:28

## 2021-01-21 VITALS — DIASTOLIC BLOOD PRESSURE: 80 MMHG | SYSTOLIC BLOOD PRESSURE: 118 MMHG

## 2021-01-21 VITALS — DIASTOLIC BLOOD PRESSURE: 79 MMHG | SYSTOLIC BLOOD PRESSURE: 132 MMHG

## 2021-01-21 RX ADMIN — POLYETHYLENE GLYCOL (3350) SCH GM: 17 POWDER, FOR SOLUTION ORAL at 09:58

## 2021-01-21 RX ADMIN — TRIAMCINOLONE ACETONIDE SCH GM: 5 CREAM TOPICAL at 21:10

## 2021-01-21 RX ADMIN — METOPROLOL TARTRATE SCH MG: 50 TABLET, FILM COATED ORAL at 09:57

## 2021-01-21 RX ADMIN — FAMOTIDINE SCH MG: 20 TABLET, FILM COATED ORAL at 09:57

## 2021-01-21 RX ADMIN — METOPROLOL TARTRATE SCH MG: 50 TABLET, FILM COATED ORAL at 21:06

## 2021-01-21 RX ADMIN — DOCUSATE SODIUM SCH MG: 100 CAPSULE ORAL at 21:05

## 2021-01-21 RX ADMIN — DOCUSATE SODIUM AND SENNOSIDES SCH EA: 8.6; 5 TABLET, FILM COATED ORAL at 21:05

## 2021-01-21 RX ADMIN — DOCUSATE SODIUM SCH MG: 100 CAPSULE ORAL at 09:57

## 2021-01-21 RX ADMIN — TRIAMCINOLONE ACETONIDE SCH GM: 5 CREAM TOPICAL at 09:59

## 2021-01-21 RX ADMIN — PREDNISONE SCH MG: 5 TABLET ORAL at 09:57

## 2021-01-21 RX ADMIN — MIRTAZAPINE SCH MG: 15 TABLET, ORALLY DISINTEGRATING ORAL at 21:05

## 2021-01-21 RX ADMIN — POLYETHYLENE GLYCOL (3350) SCH GM: 17 POWDER, FOR SOLUTION ORAL at 21:05

## 2021-01-21 RX ADMIN — DOCUSATE SODIUM AND SENNOSIDES SCH EA: 8.6; 5 TABLET, FILM COATED ORAL at 09:57

## 2021-01-21 RX ADMIN — MELATONIN 3 MG ORAL TABLET PRN MG: 3 TABLET ORAL at 21:05

## 2021-01-21 RX ADMIN — ENOXAPARIN SODIUM SCH MG: 100 INJECTION SUBCUTANEOUS at 09:58

## 2021-01-21 NOTE — NUR
RD ASSESSMENT 



PMHx: pneumonia; COVID-19



PT INTERACTION: Pt was awake and pleasant during nutrition follow-up. Pt states he has been 
eating well since last assessment. Note avg PO intake 92% x4d, per chart review. Pt states 
no issues with n/v/c/d since last assessment. Note last BM was 1/20, and pt currently on 
bowel regimen of colace BID, senna BID, and miralax BID, per chart review. 



Est. kcal needs: 0644-2920 kcal | 20-25 kcal/kg  

Est. Pro needs:   g Pro | 1.0-1.2 g Pro/kg 



PES STATEMENT: Given current appetite and PO intake, no nutrition diagnosis at this time 
(NO-1.1). 



INTERVENTION:  

Continue with current diet order of Regular diet. 

Will continue to follow and reassess as pt needs, intake, and status change. 





SHIRA NORMAN, MS RD LD 

[phone] cell

## 2021-01-21 NOTE — OCCUPATIONAL THER DAILY NOTE
OT Current Status-Daily Note


Subjective


Pt is sitting in recliner, agreeable to OT tx.





Mental Status/Objective


Patient Orientation:  Person, Place, Time, Situation





ADL-Treatment


Therapy Code Descriptions/Definitions 





Functional [place] Measure:


0=Not Assessed/NA        4=Minimal Assistance


1=Total Assistance        5=Supervision or Setup


2=Maximal Assistance  6=Modified [place]


3=Moderate Assistance 7=Complete IndependenceSCALE: Activities may be completed 

with or without assistive devices.





6-Indepedent-patient completes the activity by him/herself with no assistance 

from a helper.


5-Set-up or Clean-up Assistance-helper sets up or cleans up; patient completes 

activity. [place] assists only prior to or  


    following the activity.


4-Supervision or Touching Assistance-helper provides verbal cues and/or 

touching/steadying and/or contact guard assistance as patient completes 

activity. Assistance may be provided   


    throughout the activity or intermittently.


3-Partial/Moderate Assistance-helper does LESS THAN HALF the effort. [place] 

lifts, holds or supports trunk or limbs, but provides less than half the effort.


2-Substantial/Maximal Assistance-helper does MORE THAN HALF the effort. [place] 

lifts or holds trunk or limbs and provides more than half the effort.


9-Ypwsazfsd-knarkd does ALL the effort. Patient does none of the effort to 

complete the activity. Or, the assistance of 2 or more helpers is required for 

the patient to complete the  


    activity.


If activity was not attempted, code reason:


7-Patient Refused.


9-Not Applicable-not attempted and the patient did not perform the activity 

before the current illness, exacerbation or injury.


10-Not Attempted due to Environmental Limitations-(lack of equipment, weather 

restraints, etc.).


88-Not Attempted due to Medical Conditions or Safety Concerns.


Upper Body Dressing (QC):  3 (Rick, Pt able to thread arms through shirt, 

needing assisatnce with threading overhead due to opening of shirt was small. Pt

able to adjust down trunk with assistance for adjusting down back)


Lower Body Dressing (QC):  4 (SBA in stand for pant hike. Pt able to complete 

all parts.)





Other Treatment


Pt completed dressing at recliner, then ambulated from recliner of pt's room to 

therapy gym using FWW with no rest breaks, SBA with w/c follow. Pt participated 

in pulley activity for 5 min with a rest break at 2min, to increase BUE ROM and 

activity tolerance. Pt performed ring arch activity 2x through while standing, 

alternating arms, to increase BUE ROM and activity tolerance. Pt took frequent 

rest breaks by sitting in w/c or standing and taking a break. pt was reminded 

constantly to take a deep breath in through nose and out through mouth. Pt then 

complete a medium resistance theraputty exercise while sitting, with ~20 beads 

to work on hand strengthening, fine motor strength/coordination, and activity 

tolerance. Pt was returned to room by w/c and transferred from w/c to recliner 

using FWW. Post tx, pt sat in recliner with call light in reach and all needs 

met.





Education


OT Patient Education:  Energy conservation, Modified ADL techniques, Progress 

toward Goal/Update tx plan, Purpose of tx/functional activities, Safety issues, 

W/C management


Teaching Recipient:  Patient


Teaching Methods:  Demonstration, Discussion


Response to Teaching:  Verbalize Understanding





OT Short Term Goals


Short Term Goals


Time Frame:  2021


Eatin


Oral hygiene:  5


Toileting hygiene:  4


Shower/bathe self:  4


Upper body dressin


Lower body dressin


Putting on/taking off footwear:  4





OT Long Term Goals


Long Term Goals


Time Frame:  Feb 10, 2021


Eating (QC):  6


Oral Hygiene (QC):  6


Toileting Hygiene (QC):  6


Shower/Bathe Self (QC):  6


Upper Body Dressing (QC):  6


Lower Body Dressing (QC):  6


On/Off Footwear (QC):  6


Additional Goals:  1-Demonstrate ADL Tasks, 2-Verbalize Understanding, 

3-ImproveStrength/[place]


1=Demonstrate adherence to instructed precautions during ADL tasks.


2=Patient will verbalize/demonstrate understanding of assistive 

devices/modifications for ADL.


3=Patient will improve strength/tolerance for activity to enable patient to 

perform ADL's.





OT Education/Plan


Problem List/Assessment


Assessment:  Decreased Activ Tolerance, Decreased UE Strength, Impaired I ADL's,

Impaired Self-Care Skills, Restricted Funct UE ROM





Discharge Recommendations


Plan/Recommendations:  Continue POC





Treatment Plan/Plan of Care


Patient would benefit from OT for education, treatment and training to promote 

independence in ADL's, mobility, safety and/or upper extremity function for 

ADL's.


Plan of Care:  ADL Retraining, Caregiver Training, Concurrent Therapy, 

Functional Mobility, Group Exercise/Act as Ind, UE Funct Exercise/Act, UE 

Neuromus Re-Ed/Coord


Treatment Duration:  2021


Frequency:  Modified Program (IRF)


Estimated Hrs Per Day:  1 hour per day


Agreement:  Yes


Rehab Potential:  Good





Time/GCodes


Start Time:  09:00


Stop Time:  10:00


Total Time Billed (hr/min):  60


Billed Treatment Time


1, EX (10'), FA 3 (45')











GADIEL MAS OT          2021 09:59

## 2021-01-21 NOTE — PHYSICAL THERAPY DAILY NOTE
PT Daily Note-Current


Subjective


Pt laying Supine in bed upon arrival.  Pt agrees to PT.





Pain





   Location:  No Pain Reported





Mental Status


Patient Orientation:  Person, Place, Time, Situation





Transfers


SCALE: Activities may be completed with or without assistive devices.





6-Indepedent-patient completes the activity by him/herself with no assistance 

from a helper.


5-Set-up or Clean-up Assistance-helper sets up or cleans up; patient completes 

activity. [place] assists only prior to or  


    following the activity.


4-Supervision or Touching Assistance-helper provides verbal cues and/or 

touching/steadying and/or contact guard assistance as patient completes 

activity. Assistance may be provided   


    throughout the activity or intermittently.


3-Partial/Moderate Assistance-helper does LESS THAN HALF the effort. [place] 

lifts, holds or supports trunk or limbs, but provides less than half the effort.


2-Substantial/Maximal Assistance-helper does MORE THAN HALF the effort. [place] 

lifts or holds trunk or limbs and provides more than half the effort.


0-Gmujfbcfq-tpmddi does ALL the effort. Patient does none of the effort to 

complete the activity. Or, the assistance of 2 or more helpers is required for 

the patient to complete the  


    activity.


If activity was not attempted, code reason:


7-Patient Refused.


9-Not Applicable-not attempted and the patient did not perform the activity be

fore the current illness, exacerbation or injury.


10-Not Attempted due to Environmental Limitations-(lack of equipment, weather 

restraints, etc.).


88-Not Attempted due to Medical Conditions or Safety Concerns.


Roll Left & Right (QC):  5





Weight Bearing


Right Lower Extremity:  Right


Full Weight Bearing


Left Lower Extremity:  Left


Full Weight Bearing





Exercises


Supine Ex:  Bridging, Ankle pumps, Quad Set, Glut sets, Heel Slides, Straight 

leg raise, Hip abd/add


Supine Reps:  20





Treatments


Pt completes Supine EX to aide in continued strength gain for improved standing 

balance.  All needs met, call light in hand as pt lays Supine in bed.





Assessment


Current Status:  Good Progress


Pt continues to push self until fatigued to continue progress.





PT Short Term Goals


Short Term Goals


Time Frame:  2021


Roll Left & Right:  6


Sit to lyin


Lying to sitting on side of be:  6


Sit to stand:  6


Chair/bed-to-chair transfer:  6





PT Long Term Goals


Long Term Goals


PT Long Term Goals Time Frame:  Feb 3, 2021


Roll Left & Right (QC):  6


Sit to Lying (QC):  6


Lying-Sitting on Side/Bed(QC):  6


Sit to Stand (QC):  6


Chair/Bed-to-Chair Xfer(QC):  6


Toilet Transfer (QC):  6


Car Transfer (QC):  5


Does the Patient Walk:  No and Walking Goal IS indicated


Walk 10 feet (QC):  6


Walk 50ft with 2 Turns (QC):  6


Walk 150 ft (QC):  6


Walking 10ft on Uneven Surface:  6


1 Step (curb) (QC):  6


4 Steps (QC):  5


12 Steps (QC):  5


Picking up an Object (QC):  6


Does the Pt use WC or Scooter?:  No


Wheel 50 feet with 2 turns (QC:  9


Type:  N/A


Wheel 150 feet:  9


Type:  N/A





PT Plan


Problem List


Problem List:  Activity Tolerance, Functional Strength





Treatment/Plan


Treatment Plan:  Continue Plan of Care


Treatment Plan:  Bed Mobility, Education, Functional Activity [place], Functional 

Strength, Group Therapy, Gait, Safety, Therapeutic Exercise, Transfers


Treatment Duration:  Feb 3, 2021


Frequency:  Modified Program (IRF)


Estimated Hrs Per Day:  Other


Patient and/or Family Agrees t:  Yes





Safety Risks/Education


Patient Education:  Correct Positioning, Safety Issues


Teaching Recipient:  Patient


Teaching Methods:  Discussion


Response to Teaching:  Verbalize Understanding





Time/GCodes


Time In:  1405


Time Out:  1435


Total Billed Treatment Time:  30


Total Billed Treatment


1, EX x2 (30m)











YAEL GALVAN PTA              2021 15:29

## 2021-01-21 NOTE — PM&R PROGRESS NOTE
Subjective


HPI/CC On Admission


Date Seen by Provider:  2021


Time Seen by Provider:  11:00


Subjective/Events-last exam


21:


Bowels moved today


Urinating pretty well


Denies any significant new concerns


Gaining strength








21:


Bowels are moving 


Doing very well 


Dramatic improvement walking 


Overall no significant issues








21:


Pt doing pretty well


Slept well


Denies any significant new issues


Participating in therapy








21:


Pt slept really well 


Doing well 


No pain 


Getting stronger 


Monitoring closely








21:


Did not sleep well so willing to try Remeron 15mg and Melatonin


Motivated


Scoliosis noted


Ankle weights provided to help strengthening








21:


Pastoral care visited him and that was helpful given 3 immediate family members 

have  in the past 1 month


Trach and PEG dressing changes


[place] on heels


Gaining strength








1/15/21:


Pastoral care notified


Family members 3 of them have  within 2 months


Tremors noted


Heels sore


PEG  placed








21:


Wants PEG tube removed


History of placement 20


No issues


Up in chair today


Tachycardia noted and on BB


NO pain





Review of Systems


General:  Fatigue


Pulmonary:  Dyspnea, Cough


Neurological:  Weakness





Objective


Exam


Vital Signs





Vital Signs








  Date Time  Temp Pulse Resp B/P (MAP) Pulse Ox O2 Delivery O2 Flow Rate FiO2


 


21 20:30      Room Air  


 


21 18:20     95   


 


21 16:31 36.4 89 16 118/80 (93)    





Capillary Refill :


General Appearance:  No Apparent Distress, WD/WN, Anxious, Thin


HEENT:  PERRL/EOMI, Normal ENT Inspection, Pharynx Normal


Neck:  Full Range of Motion, Normal Inspection, Non Tender, Supple, Carotid 

Bruit


Respiratory:  Chest Non Tender, Lungs Clear, No Accessory Muscle Use, No 

Respiratory Distress, Decreased Breath Sounds


Cardiovascular:  Regular Rate, Rhythm, No Edema, No Gallop, No JVD, No Murmur, 

Normal Peripheral Pulses


Gastrointestinal:  Normal Bowel Sounds, No Organomegaly, No Pulsatile Mass, Non 

Tender, Soft


Back:  Normal Inspection, No CVA Tenderness, No Vertebral Tenderness


Extremity:  Normal Capillary Refill, Normal Inspection, Normal Range of Motion, 

Non Tender, No Calf Tenderness, No Pedal Edema


Neurologic/Psychiatric:  Alert, Oriented x3, No Motor/Sensory Deficits, Abnormal

Gait, Depressed Affect, Motor Weakness (generalized all extremities)


Skin:  Normal Color, Warm/Dry


Lymphatic:  No Adenopathy





Results/Procedures


Lab


Patient resulted labs reviewed.





FIM


Transfers


Therapy Code Descriptions/Definitions 





Functional [place] Measure:


0=Not Assessed/NA        4=Minimal Assistance


1=Total Assistance        5=Supervision or Setup


2=Maximal Assistance  6=Modified [place]


3=Moderate Assistance 7=Complete IndependenceSCALE: Activities may be completed 

with or without assistive devices.





6-Indepedent-patient completes the activity by him/herself with no assistance 

from a helper.


5-Set-up or Clean-up Assistance-helper sets up or cleans up; patient completes 

activity. [place] assists only prior to or  


    following the activity.


4-Supervision or Touching Assistance-helper provides verbal cues and/or 

touching/steadying and/or contact guard assistance as patient completes 

activity. Assistance may be provided   


    throughout the activity or intermittently.


3-Partial/Moderate Assistance-helper does LESS THAN HALF the effort. [place] 

lifts, holds or supports trunk or limbs, but provides less than half the effort.


2-Substantial/Maximal Assistance-helper does MORE THAN HALF the effort. [place] 

lifts or holds trunk or limbs and provides more than half the effort.


5-Vvlqlwjpf-fhgtez does ALL the effort. Patient does none of the effort to 

complete the activity. Or, the assistance of 2 or more helpers is required for 

the patient to complete the  


    activity.


If activity was not attempted, code reason:


7-Patient Refused.


9-Not Applicable-not attempted and the patient did not perform the activity 

before the current illness, exacerbation or injury.


10-Not Attempted due to Environmental Limitations-(lack of equipment, weather 

restraints, etc.).


88-Not Attempted due to Medical Conditions or Safety Concerns.


Roll Left to Right (QC):  6


Sit to Lying (QC):  6


Sit to Stand (QC):  5


Chair/Bed-to-Chair Xfer(QC):  4


Car Transfer (QC):  88





Gait Training


Does the Patient Walk?:  Yes


Distance:  150'


Walk 10 feet (QC):  4


Walk 50 ft with 2 Turns(QC):  4


Walk 150 ft (QC):  4


Walking 10ft/uneven surface-QC:  88


Gait Persons Needed:  1


Gait Assistive Device:  FWW





Wheelchair Training


Does the Pt Use a Wheelchair?:  Yes


Wheel 50 ft with 2 turns (QC):  5


Wheel 150 ft (QC):  5


Type of Wheelchair:  Manual





Stair Training


1 Step (curb) (QC):  88


4 Steps (QC):  88


12 Steps (QC):  88





Balance


Picking up an Object (QC):  88





ADL-Treatment


Eating (QC):  5 (Assist opening lemonade packet, pouring into container, and 

filling cup with tea. Pt then able to take a drink independently.)


Oral Hygiene (QC):  5 (Set up sitting edge of bed)


Shower/Bathe Self (QC):  4 (Pt able to reach all areas for sponge bath, CGA for 

standing to wash buttocks)


Upper Body Dressing (QC):  3 ([place], Pt able to thread arms through shirt, 

needing assisatnce with threading overhead due to opening of shirt was small. Pt

able to adjust down trunk with assistance for adjusting down back)


Lower Body Dressing (QC):  4 (CGA, pt able to thread pants and perform pants 

hike. CGA needed while standing)


On/Off Footwear (QC):  6 (Assist donning bilateral gripper socks.)


Toileting Hygiene (QC):  2 (Pt able to manage clothing down, assist with hygiene

and pant hike.)


Toilet Transfer (QC):  4 (CGA on/off BSC.)





Assessment/Plan


Assessment and Plan


Assess & Plan/Chief Complaint


Assessment:


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


Anemia


s/p trach now DC


Steroid maintenance





Plan:


IRF protocol


Increase ADL independence


Ambulate


O2 monitoring





21:


PEG tube when able to DC will DC


Monitor BP and HR


Monitor O2





1/15/21:


Supportive care


Monitor closely





21:


Monitor closely


BP stable


Pastoral care visit





21:


Insomnia treatment


Monitor closely


Pain management





21:


Monitor insomnia


Therapy


Wean O2





21:


Monitor O2 during exertion


Insomnia treatment


Remeron also helps depression





21:


Monitor insomnia


Continue treatment


Declines pain meds for scoliosis





21:


Improved ambulation


Improved dyspnea


Overall dramatic improvement





(1) Myopathy


(2) History of tracheostomy


(3) Frailty


(4) Cough


(5) Anemia


(6) COVID-19











THOR MATHEWS DO                2021 10:53

## 2021-01-21 NOTE — OCCUPATIONAL THER DAILY NOTE
OT Current Status-Daily Note


Subjective


Pt sat on EOB, agreeable to OT tx.





Mental Status/Objective


Patient Orientation:  Person, Place, Time, Situation





ADL-Treatment


Therapy Code Descriptions/Definitions 





Functional [place] Measure:


0=Not Assessed/NA        4=Minimal Assistance


1=Total Assistance        5=Supervision or Setup


2=Maximal Assistance  6=Modified [place]


3=Moderate Assistance 7=Complete IndependenceSCALE: Activities may be completed 

with or without assistive devices.





6-Indepedent-patient completes the activity by him/herself with no assistance 

from a helper.


5-Set-up or Clean-up Assistance-helper sets up or cleans up; patient completes 

activity. [place] assists only prior to or  


    following the activity.


4-Supervision or Touching Assistance-helper provides verbal cues and/or 

touching/steadying and/or contact guard assistance as patient completes 

activity. Assistance may be provided   


    throughout the activity or intermittently.


3-Partial/Moderate Assistance-helper does LESS THAN HALF the effort. [place] 

lifts, holds or supports trunk or limbs, but provides less than half the effort.


2-Substantial/Maximal Assistance-helper does MORE THAN HALF the effort. [place] 

lifts or holds trunk or limbs and provides more than half the effort.


9-Gfprvsszf-iythix does ALL the effort. Patient does none of the effort to 

complete the activity. Or, the assistance of 2 or more helpers is required for 

the patient to complete the  


    activity.


If activity was not attempted, code reason:


7-Patient Refused.


9-Not Applicable-not attempted and the patient did not perform the activity 

before the current illness, exacerbation or injury.


10-Not Attempted due to Environmental Limitations-(lack of equipment, weather 

restraints, etc.).


88-Not Attempted due to Medical Conditions or Safety Concerns.





Other Treatment


Pt seated EOB upon OT arrival. Pt participated in BUE exercises of x15 reps per 

exercise with a medium resistance theraband. The exercises consisted of shoulder

flexion, elbow extension(triceps), elbow flexion(biceps), horizontal 

abduction/adduction. pt needed no rest break in between. Pt proceeded in an 

clothes pin activity to work on hand strength, activity tolerance, fine motor 

coordination, and functional reaching. Pt was left sitting EOB, call light in 

reach and all needs met.





Education


OT Patient Education:  Energy conservation, Exercise program, Modified ADL 

techniques, Progress toward Goal/Update tx plan, Purpose of tx/functional 

activities


Teaching Recipient:  Patient


Teaching Methods:  Demonstration


Response to Teaching:  Verbalize Understanding





OT Short Term Goals


Short Term Goals


Time Frame:  2021


Eatin


Oral hygiene:  5


Toileting hygiene:  4


Shower/bathe self:  4


Upper body dressin


Lower body dressin


Putting on/taking off footwear:  4





OT Long Term Goals


Long Term Goals


Time Frame:  Feb 10, 2021


Eating (QC):  6


Oral Hygiene (QC):  6


Toileting Hygiene (QC):  6


Shower/Bathe Self (QC):  6


Upper Body Dressing (QC):  6


Lower Body Dressing (QC):  6


On/Off Footwear (QC):  6


Additional Goals:  1-Demonstrate ADL Tasks, 2-Verbalize Understanding, 3-

ImproveStrength/[place]


1=Demonstrate adherence to instructed precautions during ADL tasks.


2=Patient will verbalize/demonstrate understanding of assistive 

devices/modifications for ADL.


3=Patient will improve strength/tolerance for activity to enable patient to 

perform ADL's.





OT Education/Plan


Problem List/Assessment


Assessment:  Decreased Activ Tolerance, Decreased UE Strength, Impaired Funct 

Balance, Impaired I ADL's, Impaired Self-Care Skills, Restricted Funct UE ROM





Discharge Recommendations


Plan/Recommendations:  Continue POC





Treatment Plan/Plan of Care


Patient would benefit from OT for education, treatment and training to promote 

independence in ADL's, mobility, safety and/or upper extremity function for 

ADL's.


Plan of Care:  ADL Retraining, Caregiver Training, Concurrent Therapy, 

Functional Mobility, Group Exercise/Act as Ind, UE Funct Exercise/Act, UE 

Neuromus Re-Ed/Coord


Treatment Duration:  2021


Frequency:  Modified Program (IRF)


Estimated Hrs Per Day:  1 hour per day


Agreement:  Yes


Rehab Potential:  Good





Time/GCodes


Start Time:  13:30


Stop Time:  14:00


Total Time Billed (hr/min):  30


Billed Treatment Time


1, EX (15'), FA (15')











GADIEL MAS OT          2021 14:09

## 2021-01-21 NOTE — PHYSICAL THERAPY DAILY NOTE
PT Daily Note-Current


Subjective


Pt laying Supine in bed upon arrival.  Pt agrees to PT.





Pain





   Location:  No Pain Reported





Mental Status


Patient Orientation:  Person, Place, Time, Situation


Attachments:  PEG Tube





Transfers


SCALE: Activities may be completed with or without assistive devices.





6-Indepedent-patient completes the activity by him/herself with no assistance 

from a helper.


5-Set-up or Clean-up Assistance-helper sets up or cleans up; patient completes 

activity. [place] assists only prior to or  


    following the activity.


4-Supervision or Touching Assistance-helper provides verbal cues and/or 

touching/steadying and/or contact guard assistance as patient completes 

activity. Assistance may be provided   


    throughout the activity or intermittently.


3-Partial/Moderate Assistance-helper does LESS THAN HALF the effort. [place] 

lifts, holds or supports trunk or limbs, but provides less than half the effort.


2-Substantial/Maximal Assistance-helper does MORE THAN HALF the effort. [place] 

lifts or holds trunk or limbs and provides more than half the effort.


6-Vfxvxkuyz-ecpjub does ALL the effort. Patient does none of the effort to 

complete the activity. Or, the assistance of 2 or more helpers is required for 

the patient to complete the  


    activity.


If activity was not attempted, code reason:


7-Patient Refused.


9-Not Applicable-not attempted and the patient did not perform the activity 

before the current illness, exacerbation or injury.


10-Not Attempted due to Environmental Limitations-(lack of equipment, weather 

restraints, etc.).


88-Not Attempted due to Medical Conditions or Safety Concerns.


Roll Left & Right (QC):  5


Sit to Lying (QC):  5


Lying to Sitting/Side of Bed(Q:  5


Sit to Stand (QC):  5





Weight Bearing


Right Lower Extremity:  Right


Full Weight Bearing


Left Lower Extremity:  Left


Full Weight Bearing





Gait Training


Does the Patient Walk?:  Yes


Distance:  150' x2


Walk 10 feet (QC):  5


Walk 50 ft with 2 Turns(QC):  5


Walk 150 ft (QC):  5


Gait Persons Needed:  1


Gait Assistive Device:  FWW


WC to follow for fatigue





Exercises


Standing:  Hamstring curls, Marching, Mini squats


Standing Reps:  15


NuStep Minutes:  10


 NuStep Workload:  6





Treatments


TF from bed to EOB to standing then amb. in hallway.  After short RB, pt uses 

NuStep for 15m at WL 6.  Pt completes a few Standing EX at //bars before walking

back to room to rest.  Pt TF to Supine in bed with all needs met, call light in 

hand and awaiting lunch.





Assessment


Current Status:  Good Progress


Pt continues to gain strength and activity tolerance daily.





PT Short Term Goals


Short Term Goals


Time Frame:  2021


Roll Left & Right:  6


Sit to lyin


Lying to sitting on side of be:  6


Sit to stand:  6


Chair/bed-to-chair transfer:  6





PT Long Term Goals


Long Term Goals


PT Long Term Goals Time Frame:  Feb 3, 2021


Roll Left & Right (QC):  6


Sit to Lying (QC):  6


Lying-Sitting on Side/Bed(QC):  6


Sit to Stand (QC):  6


Chair/Bed-to-Chair Xfer(QC):  6


Toilet Transfer (QC):  6


Car Transfer (QC):  5


Does the Patient Walk:  No and Walking Goal IS indicated


Walk 10 feet (QC):  6


Walk 50ft with 2 Turns (QC):  6


Walk 150 ft (QC):  6


Walking 10ft on Uneven Surface:  6


1 Step (curb) (QC):  6


4 Steps (QC):  5


12 Steps (QC):  5


Picking up an Object (QC):  6


Does the Pt use WC or Scooter?:  No


Wheel 50 feet with 2 turns (QC:  9


Type:  N/A


Wheel 150 feet:  9


Type:  N/A





PT Plan


Problem List


Problem List:  Activity Tolerance, Functional Strength





Treatment/Plan


Treatment Plan:  Continue Plan of Care


Treatment Plan:  Bed Mobility, Education, Functional Activity [place], Functional 

Strength, Group Therapy, Gait, Safety, Therapeutic Exercise, Transfers


Treatment Duration:  Feb 3, 2021


Frequency:  Modified Program (IRF)


Estimated Hrs Per Day:  Other


Patient and/or Family Agrees t:  Yes





Safety Risks/Education


Patient Education:  Correct Positioning, Safety Issues


Teaching Recipient:  Patient


Teaching Methods:  Discussion


Response to Teaching:  Verbalize Understanding





Time/GCodes


Time In:  1100


Time Out:  1200


Total Billed Treatment Time:  60


Total Billed Treatment


1, GT x2 (25m) & EX x2 (35m)











YAEL GALVAN PTA              2021 12:22

## 2021-01-22 VITALS — DIASTOLIC BLOOD PRESSURE: 77 MMHG | SYSTOLIC BLOOD PRESSURE: 116 MMHG

## 2021-01-22 VITALS — DIASTOLIC BLOOD PRESSURE: 70 MMHG | SYSTOLIC BLOOD PRESSURE: 131 MMHG

## 2021-01-22 RX ADMIN — POLYETHYLENE GLYCOL (3350) SCH GM: 17 POWDER, FOR SOLUTION ORAL at 09:21

## 2021-01-22 RX ADMIN — METOPROLOL TARTRATE SCH MG: 50 TABLET, FILM COATED ORAL at 21:02

## 2021-01-22 RX ADMIN — MIRTAZAPINE SCH MG: 15 TABLET, ORALLY DISINTEGRATING ORAL at 21:02

## 2021-01-22 RX ADMIN — DOCUSATE SODIUM AND SENNOSIDES SCH EA: 8.6; 5 TABLET, FILM COATED ORAL at 21:05

## 2021-01-22 RX ADMIN — METOPROLOL TARTRATE SCH MG: 50 TABLET, FILM COATED ORAL at 09:17

## 2021-01-22 RX ADMIN — ENOXAPARIN SODIUM SCH MG: 100 INJECTION SUBCUTANEOUS at 09:17

## 2021-01-22 RX ADMIN — DOCUSATE SODIUM SCH MG: 100 CAPSULE ORAL at 21:05

## 2021-01-22 RX ADMIN — FAMOTIDINE SCH MG: 20 TABLET, FILM COATED ORAL at 09:17

## 2021-01-22 RX ADMIN — DOCUSATE SODIUM SCH MG: 100 CAPSULE ORAL at 09:21

## 2021-01-22 RX ADMIN — POLYETHYLENE GLYCOL (3350) SCH GM: 17 POWDER, FOR SOLUTION ORAL at 21:05

## 2021-01-22 RX ADMIN — PREDNISONE SCH MG: 5 TABLET ORAL at 09:17

## 2021-01-22 RX ADMIN — DOCUSATE SODIUM AND SENNOSIDES SCH EA: 8.6; 5 TABLET, FILM COATED ORAL at 09:21

## 2021-01-22 NOTE — PHYSICAL THERAPY DAILY NOTE
PT Daily Note-Current


Subjective


Patient in recliner pre tx, agrees to PT, has 8/10 back pain, needs to have a 

BM, will use sit to stand machine to transfer to commode.





Appearance


Patient in bed post tx with nurse call, phone, tray, SCD's on, wife in room.





Mental Status


Patient Orientation:  Person, Place, Situation


back brace





Transfers


SCALE: Activities may be completed with or without assistive devices.





6-Indepedent-patient completes the activity by him/herself with no assistance 

from a helper.


5-Set-up or Clean-up Assistance-helper sets up or cleans up; patient completes 

activity. [place] assists only prior to or  


    following the activity.


4-Supervision or Touching Assistance-helper provides verbal cues and/or 

touching/steadying and/or contact guard assistance as patient completes 

activity. Assistance may be provided   


    throughout the activity or intermittently.


3-Partial/Moderate Assistance-helper does LESS THAN HALF the effort. [place] 

lifts, holds or supports trunk or limbs, but provides less than half the effort.


2-Substantial/Maximal Assistance-helper does MORE THAN HALF the effort. [place] 

lifts or holds trunk or limbs and provides more than half the effort.


0-Nwvllwpaz-qfefgy does ALL the effort. Patient does none of the effort to 

complete the activity. Or, the assistance of 2 or more helpers is required for 

the patient to complete the  


    activity.


If activity was not attempted, code reason:


7-Patient Refused.


9-Not Applicable-not attempted and the patient did not perform the activity 

before the current illness, exacerbation or injury.


10-Not Attempted due to Environmental Limitations-(lack of equipment, weather 

restraints, etc.).


88-Not Attempted due to Medical Conditions or Safety Concerns.


Sit to Lying (QC):  1


Sit to Stand (QC):  1





Weight Bearing


Right Lower Extremity:  Right


Full Weight Bearing


Left Lower Extremity:  Left


Full Weight Bearing





Exercises


Supine Ex:  Ankle pumps, Quad Set, Glut sets, Heel Slides, Short Arc Quads, 

Straight leg raise (AAROM), Hip abd/add


Supine Reps:  20





Treatments


bed mobility and transfers, LE exercise, toileting





Assessment


Current Status:  Poor Progress


poor motivation, no change in mobility





PT Short Term Goals


Short Term Goals


Time Frame:  2021


Roll Left & Right:  6


Sit to lyin


Lying to sitting on side of be:  6


Sit to stand:  6


Chair/bed-to-chair transfer:  6





PT Long Term Goals


Long Term Goals


PT Long Term Goals Time Frame:  Feb 3, 2021


Roll Left & Right (QC):  6


Sit to Lying (QC):  6


Lying-Sitting on Side/Bed(QC):  6


Sit to Stand (QC):  6


Chair/Bed-to-Chair Xfer(QC):  6


Toilet Transfer (QC):  6


Car Transfer (QC):  5


Does the Patient Walk:  No and Walking Goal IS indicated


Walk 10 feet (QC):  6


Walk 50ft with 2 Turns (QC):  6


Walk 150 ft (QC):  6


Walking 10ft on Uneven Surface:  6


1 Step (curb) (QC):  6


4 Steps (QC):  5


12 Steps (QC):  5


Picking up an Object (QC):  6


Does the Pt use WC or Scooter?:  No


Wheel 50 feet with 2 turns (QC:  9


Type:  N/A


Wheel 150 feet:  9


Type:  N/A





PT Plan


Problem List


Problem List:  Activity Tolerance, Functional Strength, Safety, Balance, Gait, 

Transfer, Bed Mobility, ROM





Treatment/Plan


Treatment Plan:  Continue Plan of Care


Treatment Plan:  Bed Mobility, Education, Functional Activity [place], Functional 

Strength, Group Therapy, Gait, Safety, Therapeutic Exercise, Transfers


Treatment Duration:  Feb 3, 2021


Frequency:  Modified Program (IRF)


Estimated Hrs Per Day:  Other


Patient and/or Family Agrees t:  Yes





Safety Risks/Education


Patient Education:  Transfer Techniques, Correct Positioning, Safety Issues


Teaching Recipient:  Patient


Teaching Methods:  Demonstration, Discussion


Response to Teaching:  Reinforcement Needed





Time/GCodes


Time In:  1300


Time Out:  1330


Total Billed Treatment Time:  30


Total Billed Treatment


1 visit


FA 15'


EX 15'











LISA WOOD PT                2021 13:27

## 2021-01-22 NOTE — OCCUPATIONAL THER DAILY NOTE
OT Current Status-Daily Note


Subjective


Pt laying in bed, agreeable to OT tx.





Mental Status/Objective


Patient Orientation:  Person, Place, Time, Situation





ADL-Treatment


Therapy Code Descriptions/Definitions 





Functional [place] Measure:


0=Not Assessed/NA        4=Minimal Assistance


1=Total Assistance        5=Supervision or Setup


2=Maximal Assistance  6=Modified [place]


3=Moderate Assistance 7=Complete IndependenceSCALE: Activities may be completed 

with or without assistive devices.





6-Indepedent-patient completes the activity by him/herself with no assistance 

from a helper.


5-Set-up or Clean-up Assistance-helper sets up or cleans up; patient completes 

activity. [place] assists only prior to or  


    following the activity.


4-Supervision or Touching Assistance-helper provides verbal cues and/or 

touching/steadying and/or contact guard assistance as patient completes 

activity. Assistance may be provided   


    throughout the activity or intermittently.


3-Partial/Moderate Assistance-helper does LESS THAN HALF the effort. [place] 

lifts, holds or supports trunk or limbs, but provides less than half the effort.


2-Substantial/Maximal Assistance-helper does MORE THAN HALF the effort. [place] 

lifts or holds trunk or limbs and provides more than half the effort.


0-Ooxymmvaj-fcinrc does ALL the effort. Patient does none of the effort to 

complete the activity. Or, the assistance of 2 or more helpers is required for 

the patient to complete the  


    activity.


If activity was not attempted, code reason:


7-Patient Refused.


9-Not Applicable-not attempted and the patient did not perform the activity 

before the current illness, exacerbation or injury.


10-Not Attempted due to Environmental Limitations-(lack of equipment, weather 

restraints, etc.).


88-Not Attempted due to Medical Conditions or Safety Concerns.


Oral Hygiene (QC):  7


Shower/Bathe Self (QC):  4 (Pt able to wash/dry all parts seated on SC, 

supervision in stand at [facility].)


Upper Body Dressing (QC):  5 (set up)


Lower Body Dressing (QC):  3 ([place], pt able to doff Independently. Pt able to 

don R legs and perform pants hike while standing. Assistance to thread L leg)


On/Off Footwear:  3 (pt able to doff socks and don R foot. Assistance needed for

L leg)





Other Treatment


Pt laying in bed at beginning of tx. Pt transferred from EOB then to standing 

with FWW. Pt ambulated from bed to shower at SBA, FWW. Pt sat on shower bench 

and doffed all clothes. Pt began and finished shower at supervision. Pt donned 

socks with assistance with L foot and donned pants with need assistance to 

thread L leg through. Pt donned shirt and stood to perform pants hike. 

throughout dressing, pt needed frequent rest breaks. Pt transferred from shower 

bench to bed using FWW and ambulated at SBA. Pt sat EOB, then transferred supine

with call light in reach and all needs met.





Education


OT Patient Education:  Correct positioning, Energy conservation, Modified ADL 

techniques, Progress toward Goal/Update tx plan, Purpose of tx/functional 

activities


Teaching Recipient:  Patient


Teaching Methods:  Discussion


Response to Teaching:  Verbalize Understanding





OT Short Term Goals


Short Term Goals


Time Frame:  2021


Eatin


Oral hygiene:  5


Toileting hygiene:  4


Shower/bathe self:  4


Upper body dressin


Lower body dressin


Putting on/taking off footwear:  4





OT Long Term Goals


Long Term Goals


Time Frame:  Feb 10, 2021


Eating (QC):  6


Oral Hygiene (QC):  6


Toileting Hygiene (QC):  6


Shower/Bathe Self (QC):  6


Upper Body Dressing (QC):  6


Lower Body Dressing (QC):  6


On/Off Footwear (QC):  6


Additional Goals:  1-Demonstrate ADL Tasks, 2-Verbalize Understanding, 3-

ImproveStrength/[place]


1=Demonstrate adherence to instructed precautions during ADL tasks.


2=Patient will verbalize/demonstrate understanding of assistive 

devices/modifications for ADL.


3=Patient will improve strength/tolerance for activity to enable patient to 

perform ADL's.





OT Education/Plan


Problem List/Assessment


Assessment:  Decreased Activ Tolerance, Decreased UE Strength, Impaired Funct 

Balance, Impaired I ADL's, Impaired Self-Care Skills, Restricted Funct UE ROM





Discharge Recommendations


Plan/Recommendations:  Continue POC





Treatment Plan/Plan of Care


Patient would benefit from OT for education, treatment and training to promote 

independence in ADL's, mobility, safety and/or upper extremity function for 

ADL's.


Plan of Care:  ADL Retraining, Caregiver Training, Concurrent Therapy, 

Functional Mobility, Group Exercise/Act as Ind, UE Funct Exercise/Act, UE 

Neuromus Re-Ed/Coord


Treatment Duration:  2021


Frequency:  Modified Program (IRF)


Estimated Hrs Per Day:  1 hour per day


Agreement:  Yes


Rehab Potential:  Good





Time/GCodes


Start Time:  09:00


Stop Time:  10:00


Total Time Billed (hr/min):  60


Billed Treatment Time


1, ADL 4











GADIEL MAS OT          2021 10:26

## 2021-01-22 NOTE — OCCUPATIONAL THER DAILY NOTE
OT Current Status-Daily Note


Subjective


Pt describes that he wants to stay in room because he was worn out from PT this 

morning. With minimal encouragement, agreed to go to therapy gym for exercise.





Mental Status/Objective


Patient Orientation:  Person, Place, Time, Situation





ADL-Treatment


Therapy Code Descriptions/Definitions 





Functional [place] Measure:


0=Not Assessed/NA        4=Minimal Assistance


1=Total Assistance        5=Supervision or Setup


2=Maximal Assistance  6=Modified [place]


3=Moderate Assistance 7=Complete IndependenceSCALE: Activities may be completed 

with or without assistive devices.





6-Indepedent-patient completes the activity by him/herself with no assistance 

from a helper.


5-Set-up or Clean-up Assistance-helper sets up or cleans up; patient completes 

activity. [place] assists only prior to or  


    following the activity.


4-Supervision or Touching Assistance-helper provides verbal cues and/or 

touching/steadying and/or contact guard assistance as patient completes 

activity. Assistance may be provided   


    throughout the activity or intermittently.


3-Partial/Moderate Assistance-helper does LESS THAN HALF the effort. [place] 

lifts, holds or supports trunk or limbs, but provides less than half the effort.


2-Substantial/Maximal Assistance-helper does MORE THAN HALF the effort. [place] 

lifts or holds trunk or limbs and provides more than half the effort.


6-Fbvgmojjl-wsgpbn does ALL the effort. Patient does none of the effort to 

complete the activity. Or, the assistance of 2 or more helpers is required for 

the patient to complete the  


    activity.


If activity was not attempted, code reason:


7-Patient Refused.


9-Not Applicable-not attempted and the patient did not perform the activity 

before the current illness, exacerbation or injury.


10-Not Attempted due to Environmental Limitations-(lack of equipment, weather 

restraints, etc.).


88-Not Attempted due to Medical Conditions or Safety Concerns.


Oral Hygiene (QC):  5 (Set up)





Other Treatment


PT/OT cotreat due to skill of 2 clinicians required which a rehab tech could not

perform in order to coordinate UE/LEs with tasks, and due to pt's limitations in

standing balance, activity tolerance, mobility, and strength. OT focused on UE 

placement, UE exercises, and cues for sequencing/safety, PT focused on 

ambulation, transfers, LE placement, and dynamic standing balance. Pt began 

session laying in bed, then transferred to EOB sitting. Pt participated in oral 

care at set up. Pt stood with FWW and ambulated with FWW to therapy gym. Pt 

participated in medium resistance theraband exercises of the following while 

standing at parallel bars; shoulder flexion, elbow flexion, elbow extension, 

external rotation, scaption and horizontal abduction. Pt took a rest break and 

then stood again and participated in another standing activity to work on hand 

strengthening and activity tolerance. Pt placed/removed graded clothespins (1-5 

lbs).  Pt took a rest break and then stood and ambulated back to room with FWW. 

pt was left in bed with call light in reach and all needs met.





Education


OT Patient Education:  Energy conservation, Exercise program, Modified ADL 

techniques, Progress toward Goal/Update tx plan, Purpose of tx/functional 

activities, Safety issues, Transfer techniques


Teaching Recipient:  Patient


Teaching Methods:  Discussion


Response to Teaching:  Verbalize Understanding





OT Short Term Goals


Short Term Goals


Time Frame:  2021


Eatin


Oral hygiene:  5


Toileting hygiene:  4


Shower/bathe self:  4


Upper body dressin


Lower body dressin


Putting on/taking off footwear:  4





OT Long Term Goals


Long Term Goals


Time Frame:  Feb 10, 2021


Eating (QC):  6


Oral Hygiene (QC):  6


Toileting Hygiene (QC):  6


Shower/Bathe Self (QC):  6


Upper Body Dressing (QC):  6


Lower Body Dressing (QC):  6


On/Off Footwear (QC):  6


Additional Goals:  1-Demonstrate ADL Tasks, 2-Verbalize Understanding, 3-

ImproveStrength/[place]


1=Demonstrate adherence to instructed precautions during ADL tasks.


2=Patient will verbalize/demonstrate understanding of assistive 

devices/modifications for ADL.


3=Patient will improve strength/tolerance for activity to enable patient to 

perform ADL's.





OT Education/Plan


Problem List/Assessment


Assessment:  Decreased Activ Tolerance, Decreased UE Strength, Impaired Funct 

Balance, Impaired I ADL's, Impaired Self-Care Skills, Restricted Funct UE ROM





Discharge Recommendations


Plan/Recommendations:  Continue POC





Treatment Plan/Plan of Care


Patient would benefit from OT for education, treatment and training to promote 

independence in ADL's, mobility, safety and/or upper extremity function for 

ADL's.


Plan of Care:  ADL Retraining, Caregiver Training, Concurrent Therapy, 

Functional Mobility, Group Exercise/Act as Ind, UE Funct Exercise/Act, UE 

Neuromus Re-Ed/Coord


Treatment Duration:  2021


Frequency:  Modified Program (IRF)


Estimated Hrs Per Day:  1 hour per day


Agreement:  Yes


Rehab Potential:  Good





Time/GCodes


Start Time:  13:30


Stop Time:  14:00


Total Time Billed (hr/min):  15


Billed Treatment Time


1, FA (15'), EX (15')











GADIEL MAS OT          2021 14:41

## 2021-01-22 NOTE — PHYSICAL THERAPY DAILY NOTE
PT Daily Note-Current


Subjective


Patient sitting EOB pre tx, agrees to PT, has no complaints of pain, will be co-

treating with OT to work on more advanced standing activity and endurance





Appearance


Patient in bed post tx with nurse call, phone, tray, all needs met.





Mental Status


Patient Orientation:  Person, Place, Situation





Transfers


SCALE: Activities may be completed with or without assistive devices.





6-Indepedent-patient completes the activity by him/herself with no assistance 

from a helper.


5-Set-up or Clean-up Assistance-helper sets up or cleans up; patient completes 

activity. [place] assists only prior to or  


    following the activity.


4-Supervision or Touching Assistance-helper provides verbal cues and/or 

touching/steadying and/or contact guard assistance as patient completes 

activity. Assistance may be provided   


    throughout the activity or intermittently.


3-Partial/Moderate Assistance-helper does LESS THAN HALF the effort. [place] 

lifts, holds or supports trunk or limbs, but provides less than half the effort.


2-Substantial/Maximal Assistance-helper does MORE THAN HALF the effort. [place] 

lifts or holds trunk or limbs and provides more than half the effort.


2-Ajzhxpfys-guhimk does ALL the effort. Patient does none of the effort to 

complete the activity. Or, the assistance of 2 or more helpers is required for 

the patient to complete the  


    activity.


If activity was not attempted, code reason:


7-Patient Refused.


9-Not Applicable-not attempted and the patient did not perform the activity 

before the current illness, exacerbation or injury.


10-Not Attempted due to Environmental Limitations-(lack of equipment, weather 

restraints, etc.).


88-Not Attempted due to Medical Conditions or Safety Concerns.


Roll Left & Right (QC):  6


Sit to Lying (QC):  6


Sit to Stand (QC):  4


Chair/Bed-to-Chair Xfer(QC):  4





Weight Bearing


Right Lower Extremity:  Right


Full Weight Bearing


Left Lower Extremity:  Left


Full Weight Bearing





Gait Training


Distance:  120'x2


Walk 10 feet (QC):  4


Walk 50 ft with 2 Turns(QC):  4


Gait Assistive Device:  FWW


slow but steady ambulation, SOB, narrow [place]





Exercises


standing at the side of parallel bars x2 working on UE strengthening activities





Treatments


PT worked on bed mobility and transfers, ambulation, standing and endurance, OT 

worked on UE positioning and safety during activity, UE strengthening





Assessment


Current Status:  Fair Progress


improving endurance





PT Short Term Goals


Short Term Goals


Time Frame:  2021


Roll Left & Right:  6


Sit to lyin


Lying to sitting on side of be:  6


Sit to stand:  6


Chair/bed-to-chair transfer:  6





PT Long Term Goals


Long Term Goals


PT Long Term Goals Time Frame:  Feb 3, 2021


Roll Left & Right (QC):  6


Sit to Lying (QC):  6


Lying-Sitting on Side/Bed(QC):  6


Sit to Stand (QC):  6


Chair/Bed-to-Chair Xfer(QC):  6


Toilet Transfer (QC):  6


Car Transfer (QC):  5


Does the Patient Walk:  No and Walking Goal IS indicated


Walk 10 feet (QC):  6


Walk 50ft with 2 Turns (QC):  6


Walk 150 ft (QC):  6


Walking 10ft on Uneven Surface:  6


1 Step (curb) (QC):  6


4 Steps (QC):  5


12 Steps (QC):  5


Picking up an Object (QC):  6


Does the Pt use WC or Scooter?:  No


Wheel 50 feet with 2 turns (QC:  9


Type:  N/A


Wheel 150 feet:  9


Type:  N/A





PT Plan


Problem List


Problem List:  Activity Tolerance, Functional Strength, Safety, Balance, Gait, 

Transfer





Treatment/Plan


Treatment Plan:  Continue Plan of Care


Treatment Plan:  Bed Mobility, Education, Functional Activity [place], Functional 

Strength, Group Therapy, Gait, Safety, Therapeutic Exercise, Transfers


Treatment Duration:  Feb 3, 2021


Frequency:  Modified Program (IRF)


Estimated Hrs Per Day:  Other


Patient and/or Family Agrees t:  Yes





Safety Risks/Education


Patient Education:  Gait Training, Transfer Techniques, Correct Positioning, 

Safety Issues


Teaching Recipient:  Patient


Teaching Methods:  Demonstration, Discussion


Response to Teaching:  Reinforcement Needed





Time/GCodes


Time In:  1330


Time Out:  1400


Total Billed Treatment Time:  30


Total Billed Treatment


FA 30'











LISA WOOD PT                2021 14:57

## 2021-01-22 NOTE — PM&R PROGRESS NOTE
Subjective


HPI/CC On Admission


Date Seen by Provider:  2021


Time Seen by Provider:  10:30


Subjective/Events-last exam


21:


Much improved status


Monitor O2


Monitor pain


Walking well








21:


Bowels moved today


Urinating pretty well


Denies any significant new concerns


Gaining strength








21:


Bowels are moving 


Doing very well 


Dramatic improvement walking 


Overall no significant issues








21:


Pt doing pretty well


Slept well


Denies any significant new issues


Participating in therapy








21:


Pt slept really well 


Doing well 


No pain 


Getting stronger 


Monitoring closely








21:


Did not sleep well so willing to try Remeron 15mg and Melatonin


Motivated


Scoliosis noted


Ankle weights provided to help strengthening








21:


Pastoral care visited him and that was helpful given 3 immediate family members 

have  in the past 1 month


Trach and PEG dressing changes


Aleven on heels


Gaining strength








1/15/21:


Pastoral care notified


Family members 3 of them have  within 2 months


Tremors noted


Heels sore


PEG  placed








21:


Wants PEG tube removed


History of placement 20


No issues


Up in chair today


Tachycardia noted and on BB


NO pain





Review of Systems


General:  Fatigue, Malaise


Pulmonary:  Dyspnea





Objective


Exam


Vital Signs





Vital Signs








  Date Time  Temp Pulse Resp B/P (MAP) Pulse Ox O2 Delivery O2 Flow Rate FiO2


 


21 05:07 37.4 95 18 139/69 (92) 92 Room Air  





Capillary Refill :


General Appearance:  No Apparent Distress, WD/WN, Anxious, Thin


HEENT:  PERRL/EOMI, Normal ENT Inspection, Pharynx Normal


Neck:  Full Range of Motion, Normal Inspection, Non Tender, Supple, Carotid 

Bruit


Respiratory:  Chest Non Tender, Lungs Clear, No Accessory Muscle Use, No 

Respiratory Distress, Decreased Breath Sounds


Cardiovascular:  Regular Rate, Rhythm, No Edema, No Gallop, No JVD, No Murmur, 

Normal Peripheral Pulses


Gastrointestinal:  Normal Bowel Sounds, No Organomegaly, No Pulsatile Mass, Non 

Tender, Soft


Back:  Normal Inspection, No CVA Tenderness, No Vertebral Tenderness


Extremity:  Normal Capillary Refill, Normal Inspection, Normal Range of Motion, 

Non Tender, No Calf Tenderness, No Pedal Edema


Neurologic/Psychiatric:  Alert, Oriented x3, No Motor/Sensory Deficits, Abnormal

Gait, Depressed Affect, Motor Weakness (generalized all extremities)


Skin:  Normal Color, Warm/Dry


Lymphatic:  No Adenopathy





Results/Procedures


Lab


Patient resulted labs reviewed.





FIM


Transfers


Therapy Code Descriptions/Definitions 





Functional [place] Measure:


0=Not Assessed/NA        4=Minimal Assistance


1=Total Assistance        5=Supervision or Setup


2=Maximal Assistance  6=Modified [place]


3=Moderate Assistance 7=Complete IndependenceSCALE: Activities may be completed 

with or without assistive devices.





6-Indepedent-patient completes the activity by him/herself with no assistance 

from a helper.


5-Set-up or Clean-up Assistance-helper sets up or cleans up; patient completes 

activity. [place] assists only prior to or  


    following the activity.


4-Supervision or Touching Assistance-helper provides verbal cues and/or 

touching/steadying and/or contact guard assistance as patient completes 

activity. Assistance may be provided   


    throughout the activity or intermittently.


3-Partial/Moderate Assistance-helper does LESS THAN HALF the effort. [place] 

lifts, holds or supports trunk or limbs, but provides less than half the effort.


2-Substantial/Maximal Assistance-helper does MORE THAN HALF the effort. [place] 

lifts or holds trunk or limbs and provides more than half the effort.


1-Mwictdvrm-movnit does ALL the effort. Patient does none of the effort to 

complete the activity. Or, the assistance of 2 or more helpers is required for 

the patient to complete the  


    activity.


If activity was not attempted, code reason:


7-Patient Refused.


9-Not Applicable-not attempted and the patient did not perform the activity 

before the current illness, exacerbation or injury.


10-Not Attempted due to Environmental Limitations-(lack of equipment, weather 

restraints, etc.).


88-Not Attempted due to Medical Conditions or Safety Concerns.


Roll Left to Right (QC):  5


Sit to Lying (QC):  5


Sit to Stand (QC):  5


Chair/Bed-to-Chair Xfer(QC):  4


Car Transfer (QC):  88





Gait Training


Does the Patient Walk?:  Yes


Distance:  150' x2


Walk 10 feet (QC):  5


Walk 50 ft with 2 Turns(QC):  5


Walk 150 ft (QC):  5


Walking 10ft/uneven surface-QC:  88


Gait Persons Needed:  1


Gait Assistive Device:  FWW





Wheelchair Training


Does the Pt Use a Wheelchair?:  Yes


Wheel 50 ft with 2 turns (QC):  5


Wheel 150 ft (QC):  5


Type of Wheelchair:  Manual





Stair Training


1 Step (curb) (QC):  88


4 Steps (QC):  88


12 Steps (QC):  88





Balance


Picking up an Object (QC):  88





ADL-Treatment


Eating (QC):  5 (Assist opening lemonade packet, pouring into container, and 

filling cup with tea. Pt then able to take a drink independently.)


Oral Hygiene (QC):  5 (Set up sitting edge of bed)


Shower/Bathe Self (QC):  5 (Supervision)


Upper Body Dressing (QC):  5 (Supervision)


Lower Body Dressing (QC):  3 (Rick, pt able to doff Independently. Pt able to 

don R legs and perform pants hike while standing. Assistance to thread L leg)


On/Off Footwear (QC):  3 (pt able to doff socks and don R foot. Assistance 

needed for L leg)


Toileting Hygiene (QC):  2 (Pt able to manage clothing down, assist with hygiene

and pant hike.)


Toilet Transfer (QC):  4 (CGA on/off BSC.)





Assessment/Plan


Assessment and Plan


Assess & Plan/Chief Complaint


Assessment:


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


Anemia


s/p trach now DC


Steroid maintenance





Plan:


IRF protocol


Increase ADL independence


Ambulate


O2 monitoring





21:


PEG tube when able to DC will DC


Monitor BP and HR


Monitor O2





1/15/21:


Supportive care


Monitor closely





21:


Monitor closely


BP stable


Pastoral care visit





21:


Insomnia treatment


Monitor closely


Pain management





21:


Monitor insomnia


Therapy


Wean O2





21:


Monitor O2 during exertion


Insomnia treatment


Remeron also helps depression





21:


Monitor insomnia


Continue treatment


Declines pain meds for scoliosis





21:


Improved ambulation


Improved dyspnea


Overall dramatic improvement





21:


Monitor O2


Increase ambulation





(1) Myopathy


(2) History of tracheostomy


(3) Frailty


(4) Cough


(5) Anemia


(6) COVID-19











THOR MATHEWS DO                2021 10:31

## 2021-01-22 NOTE — PHYSICAL THERAPY DAILY NOTE
PT Daily Note-Current


Subjective


Patient in bed pre tx, agrees to PT, has 6/10 pain in back.





Appearance


Patient in recliner post tx with nurse call, phone, tray, all needs met.





Mental Status


Patient Orientation:  Normal For Age





Transfers


SCALE: Activities may be completed with or without assistive devices.





6-Indepedent-patient completes the activity by him/herself with no assistance 

from a helper.


5-Set-up or Clean-up Assistance-helper sets up or cleans up; patient completes 

activity. [place] assists only prior to or  


    following the activity.


4-Supervision or Touching Assistance-helper provides verbal cues and/or 

touching/steadying and/or contact guard assistance as patient completes 

activity. Assistance may be provided   


    throughout the activity or intermittently.


3-Partial/Moderate Assistance-helper does LESS THAN HALF the effort. [place] 

lifts, holds or supports trunk or limbs, but provides less than half the effort.


2-Substantial/Maximal Assistance-helper does MORE THAN HALF the effort. [place] 

lifts or holds trunk or limbs and provides more than half the effort.


5-Ivdruzxrn-efoeoq does ALL the effort. Patient does none of the effort to 

complete the activity. Or, the assistance of 2 or more helpers is required for 

the patient to complete the  


    activity.


If activity was not attempted, code reason:


7-Patient Refused.


9-Not Applicable-not attempted and the patient did not perform the activity 

before the current illness, exacerbation or injury.


10-Not Attempted due to Environmental Limitations-(lack of equipment, weather 

restraints, etc.).


88-Not Attempted due to Medical Conditions or Safety Concerns.


Roll Left & Right (QC):  6


Lying to Sitting/Side of Bed(Q:  6


Sit to Stand (QC):  4


Chair/Bed-to-Chair Xfer(QC):  4





Weight Bearing


Right Lower Extremity:  Right


Full Weight Bearing


Left Lower Extremity:  Left


Full Weight Bearing





Gait Training


Distance:  120'x2


Walk 10 feet (QC):  4


Walk 50 ft with 2 Turns(QC):  4


Gait Persons Needed:  1


Gait Assistive Device:  FWW


CGA, narrow ROBERT, SOB during ambulation





Exercises


Standing:  Heel/toe raises, Mini squats


Standing Reps:  15


NuStep Minutes:  15


 NuStep Workload:  5





Treatments


bed mobility and transfers, ambulation, LE strengthening





Assessment


Current Status:  Fair Progress


improving endurance but still needs frequent rest breaks and gets SOB with 

activity





PT Short Term Goals


Short Term Goals


Time Frame:  2021


Roll Left & Right:  6


Sit to lyin


Lying to sitting on side of be:  6


Sit to stand:  6


Chair/bed-to-chair transfer:  6





PT Long Term Goals


Long Term Goals


PT Long Term Goals Time Frame:  Feb 3, 2021


Roll Left & Right (QC):  6


Sit to Lying (QC):  6


Lying-Sitting on Side/Bed(QC):  6


Sit to Stand (QC):  6


Chair/Bed-to-Chair Xfer(QC):  6


Toilet Transfer (QC):  6


Car Transfer (QC):  5


Does the Patient Walk:  No and Walking Goal IS indicated


Walk 10 feet (QC):  6


Walk 50ft with 2 Turns (QC):  6


Walk 150 ft (QC):  6


Walking 10ft on Uneven Surface:  6


1 Step (curb) (QC):  6


4 Steps (QC):  5


12 Steps (QC):  5


Picking up an Object (QC):  6


Does the Pt use WC or Scooter?:  No


Wheel 50 feet with 2 turns (QC:  9


Type:  N/A


Wheel 150 feet:  9


Type:  N/A





PT Plan


Problem List


Problem List:  Activity Tolerance, Functional Strength, Safety, Balance, Gait, 

Transfer, Bed Mobility





Treatment/Plan


Treatment Plan:  Continue Plan of Care


Treatment Plan:  Bed Mobility, Education, Functional Activity [place], Functional 

Strength, Group Therapy, Gait, Safety, Therapeutic Exercise, Transfers


Treatment Duration:  Feb 3, 2021


Frequency:  Modified Program (IRF)


Estimated Hrs Per Day:  Other


Patient and/or Family Agrees t:  Yes





Safety Risks/Education


Patient Education:  Gait Training, Transfer Techniques, Correct Positioning, 

Safety Issues


Teaching Recipient:  Patient


Teaching Methods:  Demonstration, Discussion


Response to Teaching:  Reinforcement Needed





Time/GCodes


Time In:  1100


Time Out:  1200


Total Billed Treatment Time:  60


Total Billed Treatment


1 visit


EX 30'


FA 30'











LISA WOOD PT                2021 14:47

## 2021-01-23 VITALS — DIASTOLIC BLOOD PRESSURE: 66 MMHG | SYSTOLIC BLOOD PRESSURE: 110 MMHG

## 2021-01-23 VITALS — DIASTOLIC BLOOD PRESSURE: 69 MMHG | SYSTOLIC BLOOD PRESSURE: 139 MMHG

## 2021-01-23 RX ADMIN — GUAIFENESIN AND CODEINE PHOSPHATE PRN ML: 100; 10 SOLUTION ORAL at 16:03

## 2021-01-23 RX ADMIN — PREDNISONE SCH MG: 5 TABLET ORAL at 08:28

## 2021-01-23 RX ADMIN — METOPROLOL TARTRATE SCH MG: 50 TABLET, FILM COATED ORAL at 08:28

## 2021-01-23 RX ADMIN — POLYETHYLENE GLYCOL (3350) SCH GM: 17 POWDER, FOR SOLUTION ORAL at 08:36

## 2021-01-23 RX ADMIN — DOCUSATE SODIUM SCH MG: 100 CAPSULE ORAL at 08:36

## 2021-01-23 RX ADMIN — DOCUSATE SODIUM AND SENNOSIDES SCH EA: 8.6; 5 TABLET, FILM COATED ORAL at 08:36

## 2021-01-23 RX ADMIN — FAMOTIDINE SCH MG: 20 TABLET, FILM COATED ORAL at 08:28

## 2021-01-23 RX ADMIN — DOCUSATE SODIUM SCH MG: 100 CAPSULE ORAL at 21:14

## 2021-01-23 RX ADMIN — DOCUSATE SODIUM AND SENNOSIDES SCH EA: 8.6; 5 TABLET, FILM COATED ORAL at 21:14

## 2021-01-23 RX ADMIN — MIRTAZAPINE SCH MG: 15 TABLET, ORALLY DISINTEGRATING ORAL at 21:11

## 2021-01-23 RX ADMIN — ENOXAPARIN SODIUM SCH MG: 100 INJECTION SUBCUTANEOUS at 08:27

## 2021-01-23 RX ADMIN — METOPROLOL TARTRATE SCH MG: 50 TABLET, FILM COATED ORAL at 21:11

## 2021-01-23 RX ADMIN — POLYETHYLENE GLYCOL (3350) SCH GM: 17 POWDER, FOR SOLUTION ORAL at 21:14

## 2021-01-23 NOTE — PHYSICAL THERAPY DAILY NOTE
PT Daily Note-Current


Subjective


Agrees to PT this date.  Reports he feels a little down today, mentions his 

 mother, sister and brother.  He declined need for counseling services.





Transfers


SCALE: Activities may be completed with or without assistive devices.





6-Indepedent-patient completes the activity by him/herself with no assistance 

from a helper.


5-Set-up or Clean-up Assistance-helper sets up or cleans up; patient completes 

activity. [place] assists only prior to or  


    following the activity.


4-Supervision or Touching Assistance-helper provides verbal cues and/or 

touching/steadying and/or contact guard assistance as patient completes 

activity. Assistance may be provided   


    throughout the activity or intermittently.


3-Partial/Moderate Assistance-helper does LESS THAN HALF the effort. [place] 

lifts, holds or supports trunk or limbs, but provides less than half the effort.


2-Substantial/Maximal Assistance-helper does MORE THAN HALF the effort. [place] 

lifts or holds trunk or limbs and provides more than half the effort.


3-Opdexbwvh-elkmra does ALL the effort. Patient does none of the effort to 

complete the activity. Or, the assistance of 2 or more helpers is required for 

the patient to complete the  


    activity.


If activity was not attempted, code reason:


7-Patient Refused.


9-Not Applicable-not attempted and the patient did not perform the activity 

before the current illness, exacerbation or injury.


10-Not Attempted due to Environmental Limitations-(lack of equipment, weather 

restraints, etc.).


88-Not Attempted due to Medical Conditions or Safety Concerns.


Sit to Lying (QC):  5


Lying to Sitting/Side of Bed(Q:  5


Sit to Stand (QC):  4


Chair/Bed-to-Chair Xfer(QC):  4





Weight Bearing


Right Lower Extremity:  Right


Full Weight Bearing


Left Lower Extremity:  Left


Full Weight Bearing





Gait Training


Walk 150 ft (QC):  3 (CGA for safety)


Gait Assistive Device:  FWW


150 ft x 2 with FWW with CGA; decreased velocity, but safe and steady.





Exercises


NuStep Minutes:  15


 NuStep Workload:  5 (Fct strength and activity tolerance progression to improve

transfers and gait for increased level of indep. )





Assessment


Current Status:  Good Progress


Transfers and gait are improving.  Strength progressing with increased toelrance

to functional activity levels.





PT Short Term Goals


Short Term Goals


Time Frame:  2021


Roll Left & Right:  6


Sit to lyin


Lying to sitting on side of be:  6


Sit to stand:  6


Chair/bed-to-chair transfer:  6





PT Long Term Goals


Long Term Goals


PT Long Term Goals Time Frame:  Feb 3, 2021


Roll Left & Right (QC):  6


Sit to Lying (QC):  6


Lying-Sitting on Side/Bed(QC):  6


Sit to Stand (QC):  6


Chair/Bed-to-Chair Xfer(QC):  6


Toilet Transfer (QC):  6


Car Transfer (QC):  5


Does the Patient Walk:  No and Walking Goal IS indicated


Walk 10 feet (QC):  6


Walk 50ft with 2 Turns (QC):  6


Walk 150 ft (QC):  6


Walking 10ft on Uneven Surface:  6


1 Step (curb) (QC):  6


4 Steps (QC):  5


12 Steps (QC):  5


Picking up an Object (QC):  6


Does the Pt use WC or Scooter?:  No


Wheel 50 feet with 2 turns (QC:  9


Type:  N/A


Wheel 150 feet:  9


Type:  N/A





PT Plan


Problem List


Problem List:  Activity Tolerance, Functional Strength, Safety





Treatment/Plan


Treatment Plan:  Continue Plan of Care


Treatment Plan:  Bed Mobility, Education, Functional Activity [place], Functional 

Strength, Group Therapy, Gait, Safety, Therapeutic Exercise, Transfers


Treatment Duration:  Feb 3, 2021


Frequency:  Modified Program (IRF)


Estimated Hrs Per Day:  Other


Patient and/or Family Agrees t:  Yes





Safety Risks/Education


Patient Education:  Gait Training


Teaching Recipient:  Patient


Teaching Methods:  Discussion


Response to Teaching:  Return Demonstration





Time/GCodes


Time In:  910


Time Out:  940


Total Billed Treatment Time:  30


Total Billed Treatment


visit


EX 15


Gt 15











LIDIA LANGFORD PT             2021 11:48

## 2021-01-23 NOTE — NUR
Noted oxygen saturations to be 86-90% at time of VS being taken.  Patient assisted to 
reposition and Incentive Spirometry encouraged.  Oxygen Saturations continued to be 86-90%.  
Oxygen applied at 1 L.  Oxygen Saturations increased to 95%

## 2021-01-23 NOTE — PM&R PROGRESS NOTE
Subjective


HPI/CC On Admission


Date Seen by Provider:  2021


Time Seen by Provider:  13:30


Subjective/Events-last exam


21:


Patient is doing well


Ambulating well


Talked about COVID a lot today and his hospital course prior to coming here








21:


Much improved status


Monitor O2


Monitor pain


Walking well








21:


Bowels moved today


Urinating pretty well


Denies any significant new concerns


Gaining strength








21:


Bowels are moving 


Doing very well 


Dramatic improvement walking 


Overall no significant issues








21:


Pt doing pretty well


Slept well


Denies any significant new issues


Participating in therapy








21:


Pt slept really well 


Doing well 


No pain 


Getting stronger 


Monitoring closely








21:


Did not sleep well so willing to try Remeron 15mg and Melatonin


Motivated


Scoliosis noted


Ankle weights provided to help strengthening








21:


Pastoral care visited him and that was helpful given 3 immediate family members 

have  in the past 1 month


Trach and PEG dressing changes


Aleven on heels


Gaining strength








1/15/21:


Pastoral care notified


Family members 3 of them have  within 2 months


Tremors noted


Heels sore


PEG  placed








21:


Wants PEG tube removed


History of placement 20


No issues


Up in chair today


Tachycardia noted and on BB


NO pain





Review of Systems


Pulmonary:  Dyspnea


Neurological:  Weakness





Objective


Exam


Vital Signs





Vital Signs








  Date Time  Temp Pulse Resp B/P (MAP) Pulse Ox O2 Delivery O2 Flow Rate FiO2


 


21 06:42 36.4 88 18 129/79 (96) 96 Nasal Cannula 1.00 





Capillary Refill :


General Appearance:  No Apparent Distress, WD/WN, Anxious, Thin


HEENT:  PERRL/EOMI, Normal ENT Inspection, Pharynx Normal


Neck:  Full Range of Motion, Normal Inspection, Non Tender, Supple, Carotid 

Bruit


Respiratory:  Chest Non Tender, Lungs Clear, No Accessory Muscle Use, No 

Respiratory Distress, Decreased Breath Sounds


Cardiovascular:  Regular Rate, Rhythm, No Edema, No Gallop, No JVD, No Murmur, 

Normal Peripheral Pulses


Gastrointestinal:  Normal Bowel Sounds, No Organomegaly, No Pulsatile Mass, Non 

Tender, Soft


Back:  Normal Inspection, No CVA Tenderness, No Vertebral Tenderness


Extremity:  Normal Capillary Refill, Normal Inspection, Normal Range of Motion, 

Non Tender, No Calf Tenderness, No Pedal Edema


Neurologic/Psychiatric:  Alert, Oriented x3, No Motor/Sensory Deficits, Abnormal

Gait, Depressed Affect, Motor Weakness (generalized all extremities)


Skin:  Normal Color, Warm/Dry


Lymphatic:  No Adenopathy





Results/Procedures


Lab


Patient resulted labs reviewed.





FIM


Transfers


Therapy Code Descriptions/Definitions 





Functional [place] Measure:


0=Not Assessed/NA        4=Minimal Assistance


1=Total Assistance        5=Supervision or Setup


2=Maximal Assistance  6=Modified [place]


3=Moderate Assistance 7=Complete IndependenceSCALE: Activities may be completed 

with or without assistive devices.





6-Indepedent-patient completes the activity by him/herself with no assistance 

from a helper.


5-Set-up or Clean-up Assistance-helper sets up or cleans up; patient completes 

activity. [place] assists only prior to or  


    following the activity.


4-Supervision or Touching Assistance-helper provides verbal cues and/or 

touching/steadying and/or contact guard assistance as patient completes 

activity. Assistance may be provided   


    throughout the activity or intermittently.


3-Partial/Moderate Assistance-helper does LESS THAN HALF the effort. [place] 

lifts, holds or supports trunk or limbs, but provides less than half the effort.


2-Substantial/Maximal Assistance-helper does MORE THAN HALF the effort. [place] 

lifts or holds trunk or limbs and provides more than half the effort.


9-Ywykfczpo-hlankl does ALL the effort. Patient does none of the effort to 

complete the activity. Or, the assistance of 2 or more helpers is required for 

the patient to complete the  


    activity.


If activity was not attempted, code reason:


7-Patient Refused.


9-Not Applicable-not attempted and the patient did not perform the activity 

before the current illness, exacerbation or injury.


10-Not Attempted due to Environmental Limitations-(lack of equipment, weather 

restraints, etc.).


88-Not Attempted due to Medical Conditions or Safety Concerns.


Roll Left to Right (QC):  6


Sit to Lying (QC):  6


Sit to Stand (QC):  4


Chair/Bed-to-Chair Xfer(QC):  4


Car Transfer (QC):  88





Gait Training


Does the Patient Walk?:  Yes


Distance:  120'x2


Walk 10 feet (QC):  4


Walk 50 ft with 2 Turns(QC):  4


Walk 150 ft (QC):  5


Walking 10ft/uneven surface-QC:  88


Gait Persons Needed:  1


Gait Assistive Device:  FWW





Wheelchair Training


Does the Pt Use a Wheelchair?:  Yes


Wheel 50 ft with 2 turns (QC):  5


Wheel 150 ft (QC):  5


Type of Wheelchair:  Manual





Stair Training


1 Step (curb) (QC):  88


4 Steps (QC):  88


12 Steps (QC):  88





Balance


Picking up an Object (QC):  88





ADL-Treatment


Eating (QC):  5 (Assist opening lemonade packet, pouring into container, and 

filling cup with tea. Pt then able to take a drink independently.)


Oral Hygiene (QC):  5 (Set up)


Shower/Bathe Self (QC):  4 (Pt able to wash/dry all parts seated on SC, 

supervision in stand at [facility].)


Upper Body Dressing (QC):  5 (set up)


Lower Body Dressing (QC):  3 (Rick, pt able to doff Independently. Pt able to 

don R legs and perform pants hike while standing. Assistance to thread L leg)


On/Off Footwear (QC):  3 (pt able to doff socks and don R foot. Assistance 

needed for L leg)


Toileting Hygiene (QC):  2 (Pt able to manage clothing down, assist with hygiene

and pant hike.)


Toilet Transfer (QC):  4 (CGA on/off BSC.)





Assessment/Plan


Assessment and Plan


Assess & Plan/Chief Complaint


Assessment:


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


Anemia


s/p trach now DC


Steroids tapered


Scoliosis





Plan:


IRF protocol


Increase ADL independence


Ambulate


O2 monitoring





21:


PEG tube when able to DC will DC


Monitor BP and HR


Monitor O2





1/15/21:


Supportive care


Monitor closely





21:


Monitor closely


BP stable


Pastoral care visit





21:


Insomnia treatment


Monitor closely


Pain management





21:


Monitor insomnia


Therapy


Wean O2





21:


Monitor O2 during exertion


Insomnia treatment


Remeron also helps depression





21:


Monitor insomnia


Continue treatment


Declines pain meds for scoliosis





21:


Improved ambulation


Improved dyspnea


Overall dramatic improvement





21:


Monitor O2


Increase ambulation





21:


Ambulate


O2





(1) Myopathy


(2) History of tracheostomy


(3) Frailty


(4) Cough


(5) Anemia


(6) COVID-19











THOR MATHEWS DO                2021 06:01

## 2021-01-24 VITALS — DIASTOLIC BLOOD PRESSURE: 75 MMHG | SYSTOLIC BLOOD PRESSURE: 117 MMHG

## 2021-01-24 VITALS — DIASTOLIC BLOOD PRESSURE: 79 MMHG | SYSTOLIC BLOOD PRESSURE: 122 MMHG

## 2021-01-24 VITALS — SYSTOLIC BLOOD PRESSURE: 129 MMHG | DIASTOLIC BLOOD PRESSURE: 79 MMHG

## 2021-01-24 LAB
ALBUMIN SERPL-MCNC: 3.5 GM/DL (ref 3.2–4.5)
ALP SERPL-CCNC: 89 U/L (ref 40–136)
ALT SERPL-CCNC: 39 U/L (ref 0–55)
BASOPHILS # BLD AUTO: 0.1 10^3/UL (ref 0–0.1)
BASOPHILS NFR BLD AUTO: 1 % (ref 0–10)
BILIRUB SERPL-MCNC: 0.3 MG/DL (ref 0.1–1)
BUN/CREAT SERPL: 8
CALCIUM SERPL-MCNC: 8.7 MG/DL (ref 8.5–10.1)
CHLORIDE SERPL-SCNC: 103 MMOL/L (ref 98–107)
CO2 SERPL-SCNC: 22 MMOL/L (ref 21–32)
CREAT SERPL-MCNC: 1.05 MG/DL (ref 0.6–1.3)
EOSINOPHIL # BLD AUTO: 0.3 10^3/UL (ref 0–0.3)
EOSINOPHIL NFR BLD AUTO: 3 % (ref 0–10)
GFR SERPLBLD BASED ON 1.73 SQ M-ARVRAT: > 60 ML/MIN
GLUCOSE SERPL-MCNC: 101 MG/DL (ref 70–105)
HCT VFR BLD CALC: 37 % (ref 40–54)
HGB BLD-MCNC: 11.6 G/DL (ref 13.3–17.7)
LYMPHOCYTES # BLD AUTO: 1.8 10^3/UL (ref 1–4)
LYMPHOCYTES NFR BLD AUTO: 17 % (ref 12–44)
MANUAL DIFFERENTIAL PERFORMED BLD QL: NO
MCH RBC QN AUTO: 30 PG (ref 25–34)
MCHC RBC AUTO-ENTMCNC: 32 G/DL (ref 32–36)
MCV RBC AUTO: 96 FL (ref 80–99)
MONOCYTES # BLD AUTO: 0.3 10^3/UL (ref 0–1)
MONOCYTES NFR BLD AUTO: 3 % (ref 0–12)
NEUTROPHILS # BLD AUTO: 8 10^3/UL (ref 1.8–7.8)
NEUTROPHILS NFR BLD AUTO: 76 % (ref 42–75)
PLATELET # BLD: 214 10^3/UL (ref 130–400)
PMV BLD AUTO: 10.3 FL (ref 9–12.2)
POTASSIUM SERPL-SCNC: 4.2 MMOL/L (ref 3.6–5)
PROT SERPL-MCNC: 6.7 GM/DL (ref 6.4–8.2)
SODIUM SERPL-SCNC: 138 MMOL/L (ref 135–145)
WBC # BLD AUTO: 10.4 10^3/UL (ref 4.3–11)

## 2021-01-24 RX ADMIN — MIRTAZAPINE SCH MG: 15 TABLET, ORALLY DISINTEGRATING ORAL at 21:05

## 2021-01-24 RX ADMIN — FAMOTIDINE SCH MG: 20 TABLET, FILM COATED ORAL at 08:52

## 2021-01-24 RX ADMIN — PREDNISONE SCH MG: 5 TABLET ORAL at 08:52

## 2021-01-24 RX ADMIN — ENOXAPARIN SODIUM SCH MG: 100 INJECTION SUBCUTANEOUS at 08:52

## 2021-01-24 RX ADMIN — METOPROLOL TARTRATE SCH MG: 50 TABLET, FILM COATED ORAL at 08:52

## 2021-01-24 RX ADMIN — DOCUSATE SODIUM SCH MG: 100 CAPSULE ORAL at 21:05

## 2021-01-24 RX ADMIN — CEFDINIR SCH MG: 300 CAPSULE ORAL at 21:05

## 2021-01-24 RX ADMIN — METOPROLOL TARTRATE SCH MG: 50 TABLET, FILM COATED ORAL at 21:05

## 2021-01-24 RX ADMIN — POLYETHYLENE GLYCOL (3350) SCH GM: 17 POWDER, FOR SOLUTION ORAL at 21:05

## 2021-01-24 RX ADMIN — FLUTICASONE PROPIONATE AND SALMETEROL XINAFOATE SCH PUFF: 115; 21 AEROSOL, METERED RESPIRATORY (INHALATION) at 18:18

## 2021-01-24 RX ADMIN — DOCUSATE SODIUM AND SENNOSIDES SCH EA: 8.6; 5 TABLET, FILM COATED ORAL at 08:57

## 2021-01-24 RX ADMIN — DOCUSATE SODIUM SCH MG: 100 CAPSULE ORAL at 08:57

## 2021-01-24 RX ADMIN — POLYETHYLENE GLYCOL (3350) SCH GM: 17 POWDER, FOR SOLUTION ORAL at 08:57

## 2021-01-24 RX ADMIN — DOCUSATE SODIUM AND SENNOSIDES SCH EA: 8.6; 5 TABLET, FILM COATED ORAL at 21:04

## 2021-01-24 NOTE — DIAGNOSTIC IMAGING REPORT
INDICATION: Dyspnea.



PA and lateral views were obtained



FINDINGS: There is right convexity thoracic scoliosis.  There is

mild cardiomegaly. Patchy bilateral pulmonary infiltrates. No

pleural effusion or pneumothorax.  Mediastinum is unremarkable.



IMPRESSION: Patchy bibasilar infiltrates suspect for pneumonia,

possibly COVID. Recommend clinical correlation.



Some underlying central pulmonary venous congestion cannot be

excluded.



Right convexity thoracic scoliosis.



Dictated by: 



  Dictated on workstation # [id number]

## 2021-01-24 NOTE — PM&R PROGRESS NOTE
Subjective


HPI/CC On Admission


Date Seen by Provider:  2021


Time Seen by Provider:  11:30


Subjective/Events-last exam


21:


Felt like he has a "cold"


Noted O2 requirements now 1L/min


CXR revealed COVID lungs


Difficult to say whether he has bacterial PNA superimposed


Labs checked


Increase steroids


Added bronchitis abx








21:


Patient is doing well


Ambulating well


Talked about COVID a lot today and his hospital course prior to coming here








21:


Much improved status


Monitor O2


Monitor pain


Walking well








21:


Bowels moved today


Urinating pretty well


Denies any significant new concerns


Gaining strength








21:


Bowels are moving 


Doing very well 


Dramatic improvement walking 


Overall no significant issues








21:


Pt doing pretty well


Slept well


Denies any significant new issues


Participating in therapy








21:


Pt slept really well 


Doing well 


No pain 


Getting stronger 


Monitoring closely








21:


Did not sleep well so willing to try Remeron 15mg and Melatonin


Motivated


Scoliosis noted


Ankle weights provided to help strengthening








21:


Pastoral care visited him and that was helpful given 3 immediate family members 

have  in the past 1 month


Trach and PEG dressing changes


Aleven on heels


Gaining strength








1/15/21:


Pastoral care notified


Family members 3 of them have  within 2 months


Tremors noted


Heels sore


PEG  placed








21:


Wants PEG tube removed


History of placement 20


No issues


Up in chair today


Tachycardia noted and on BB


NO pain





Review of Systems


General:  Fatigue


Pulmonary:  Dyspnea, Cough





Focused Exam


Lactate Level


21 15:47: Lactic Acid Level 2.00








Objective


Exam


Vital Signs





Vital Signs








  Date Time  Temp Pulse Resp B/P (MAP) Pulse Ox O2 Delivery O2 Flow Rate FiO2


 


21 18:18     90 Room Air  


 


21 18:01 36.8 97 16 117/75 (89)   1.00 





Capillary Refill :


General Appearance:  No Apparent Distress, WD/WN, Anxious, Thin


HEENT:  PERRL/EOMI, Normal ENT Inspection, Pharynx Normal


Neck:  Full Range of Motion, Normal Inspection, Non Tender, Supple, Carotid 

Bruit


Respiratory:  Chest Non Tender, Lungs Clear, No Accessory Muscle Use, No 

Respiratory Distress, Decreased Breath Sounds


Cardiovascular:  Regular Rate, Rhythm, No Edema, No Gallop, No JVD, No Murmur, 

Normal Peripheral Pulses


Gastrointestinal:  Normal Bowel Sounds, No Organomegaly, No Pulsatile Mass, Non 

Tender, Soft


Back:  Normal Inspection, No CVA Tenderness, No Vertebral Tenderness


Extremity:  Normal Capillary Refill, Normal Inspection, Normal Range of Motion, 

Non Tender, No Calf Tenderness, No Pedal Edema


Neurologic/Psychiatric:  Alert, Oriented x3, No Motor/Sensory Deficits, Abnormal

Gait, Depressed Affect, Motor Weakness (generalized all extremities)


Skin:  Normal Color, Warm/Dry


Lymphatic:  No Adenopathy





Results/Procedures


Lab


Laboratory Tests


21 15:47








Patient resulted labs reviewed.





FIM


Transfers


Therapy Code Descriptions/Definitions 





Functional [place] Measure:


0=Not Assessed/NA        4=Minimal Assistance


1=Total Assistance        5=Supervision or Setup


2=Maximal Assistance  6=Modified [place]


3=Moderate Assistance 7=Complete IndependenceSCALE: Activities may be completed 

with or without assistive devices.





6-Indepedent-patient completes the activity by him/herself with no assistance 

from a helper.


5-Set-up or Clean-up Assistance-helper sets up or cleans up; patient completes 

activity. [place] assists only prior to or  


    following the activity.


4-Supervision or Touching Assistance-helper provides verbal cues and/or 

touching/steadying and/or contact guard assistance as patient completes 

activity. Assistance may be provided   


    throughout the activity or intermittently.


3-Partial/Moderate Assistance-helper does LESS THAN HALF the effort. [place] 

lifts, holds or supports trunk or limbs, but provides less than half the effort.


2-Substantial/Maximal Assistance-helper does MORE THAN HALF the effort. [place] 

lifts or holds trunk or limbs and provides more than half the effort.


9-Vimagipbe-szqmzj does ALL the effort. Patient does none of the effort to 

complete the activity. Or, the assistance of 2 or more helpers is required for 

the patient to complete the  


    activity.


If activity was not attempted, code reason:


7-Patient Refused.


9-Not Applicable-not attempted and the patient did not perform the activity 

before the current illness, exacerbation or injury.


10-Not Attempted due to Environmental Limitations-(lack of equipment, weather 

restraints, etc.).


88-Not Attempted due to Medical Conditions or Safety Concerns.


Roll Left to Right (QC):  6


Sit to Lying (QC):  5


Sit to Stand (QC):  4


Chair/Bed-to-Chair Xfer(QC):  4


Car Transfer (QC):  88





Gait Training


Does the Patient Walk?:  Yes


Distance:  120'x2


Walk 10 feet (QC):  4


Walk 50 ft with 2 Turns(QC):  4


Walk 150 ft (QC):  3 (CGA for safety)


Walking 10ft/uneven surface-QC:  88


Gait Persons Needed:  1


Gait Assistive Device:  FWW





Wheelchair Training


Does the Pt Use a Wheelchair?:  Yes


Wheel 50 ft with 2 turns (QC):  5


Wheel 150 ft (QC):  5


Type of Wheelchair:  Manual





Stair Training


1 Step (curb) (QC):  88


4 Steps (QC):  88


12 Steps (QC):  88





Balance


Picking up an Object (QC):  88





ADL-Treatment


Eating (QC):  5 (Assist opening lemonade packet, pouring into container, and 

filling cup with tea. Pt then able to take a drink independently.)


Oral Hygiene (QC):  5 (Set up)


Shower/Bathe Self (QC):  4 (Pt able to wash/dry all parts seated on SC, 

supervision in stand at [facility].)


Upper Body Dressing (QC):  5 (set up)


Lower Body Dressing (QC):  3 (Rick, pt able to doff Independently. Pt able to 

don R legs and perform pants hike while standing. Assistance to thread L leg)


On/Off Footwear (QC):  3 (pt able to doff socks and don R foot. Assistance 

needed for L leg)


Toileting Hygiene (QC):  2 (Pt able to manage clothing down, assist with hygiene

and pant hike.)


Toilet Transfer (QC):  4 (CGA on/off BSC.)





Assessment/Plan


Assessment and Plan


Assess & Plan/Chief Complaint


Assessment:


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


Anemia


s/p trach now DC


Steroids tapered


Scoliosis





Plan:


IRF protocol


Increase ADL independence


Ambulate


O2 monitoring





21:


PEG tube when able to DC will DC


Monitor BP and HR


Monitor O2





1/15/21:


Supportive care


Monitor closely





21:


Monitor closely


BP stable


Pastoral care visit





21:


Insomnia treatment


Monitor closely


Pain management





21:


Monitor insomnia


Therapy


Wean O2





21:


Monitor O2 during exertion


Insomnia treatment


Remeron also helps depression





21:


Monitor insomnia


Continue treatment


Declines pain meds for scoliosis





21:


Improved ambulation


Improved dyspnea


Overall dramatic improvement





21:


Monitor O2


Increase ambulation





21:


Ambulate


O2





21:


Increase steroid dose


Abx


Monitor O2


Labs





(1) Myopathy


(2) History of tracheostomy


(3) Frailty


(4) Cough


(5) Anemia


(6) COVID-19











THOR MATHEWS DO                2021 11:44

## 2021-01-25 VITALS — DIASTOLIC BLOOD PRESSURE: 81 MMHG | SYSTOLIC BLOOD PRESSURE: 137 MMHG

## 2021-01-25 VITALS — DIASTOLIC BLOOD PRESSURE: 79 MMHG | SYSTOLIC BLOOD PRESSURE: 122 MMHG

## 2021-01-25 LAB
ALBUMIN SERPL-MCNC: 3.9 GM/DL (ref 3.2–4.5)
ALP SERPL-CCNC: 93 U/L (ref 40–136)
ALT SERPL-CCNC: 46 U/L (ref 0–55)
ARTERIAL PATENCY WRIST A: (no result)
BASE EXCESS STD BLDA CALC-SCNC: -1.3 MMOL/L (ref -2.5–2.5)
BASOPHILS # BLD AUTO: 0 10^3/UL (ref 0–0.1)
BASOPHILS NFR BLD AUTO: 0 % (ref 0–10)
BDY SITE: (no result)
BILIRUB SERPL-MCNC: 0.4 MG/DL (ref 0.1–1)
BODY TEMPERATURE: 36.4
BUN/CREAT SERPL: 10
CALCIUM SERPL-MCNC: 9.5 MG/DL (ref 8.5–10.1)
CHLORIDE SERPL-SCNC: 102 MMOL/L (ref 98–107)
CO2 BLDA CALC-SCNC: 23.9 MMOL/L (ref 21–31)
CO2 SERPL-SCNC: 20 MMOL/L (ref 21–32)
CREAT SERPL-MCNC: 1.01 MG/DL (ref 0.6–1.3)
EOSINOPHIL # BLD AUTO: 0 10^3/UL (ref 0–0.3)
EOSINOPHIL NFR BLD AUTO: 0 % (ref 0–10)
GFR SERPLBLD BASED ON 1.73 SQ M-ARVRAT: > 60 ML/MIN
GLUCOSE SERPL-MCNC: 117 MG/DL (ref 70–105)
HCT VFR BLD CALC: 39 % (ref 40–54)
HGB BLD-MCNC: 12.3 G/DL (ref 13.3–17.7)
INHALED O2 FLOW RATE: (no result) L/MIN
LYMPHOCYTES # BLD AUTO: 1.5 X 10^3 (ref 1–4)
LYMPHOCYTES NFR BLD AUTO: 12 % (ref 12–44)
MANUAL DIFFERENTIAL PERFORMED BLD QL: NO
MCH RBC QN AUTO: 30 PG (ref 25–34)
MCHC RBC AUTO-ENTMCNC: 32 G/DL (ref 32–36)
MCV RBC AUTO: 95 FL (ref 80–99)
MONOCYTES # BLD AUTO: 0.3 X 10^3 (ref 0–1)
MONOCYTES NFR BLD AUTO: 3 % (ref 0–12)
NEUTROPHILS # BLD AUTO: 10.3 X 10^3 (ref 1.8–7.8)
NEUTROPHILS NFR BLD AUTO: 85 % (ref 42–75)
PCO2 BLDA: 36 MMHG (ref 35–45)
PH BLDA: 7.42 [PH] (ref 7.37–7.43)
PLATELET # BLD: 252 10^3/UL (ref 130–400)
PMV BLD AUTO: 10.4 FL (ref 9–12.2)
PO2 BLDA: 64 MMHG (ref 79–93)
POTASSIUM SERPL-SCNC: 4.2 MMOL/L (ref 3.6–5)
PROT SERPL-MCNC: 7.6 GM/DL (ref 6.4–8.2)
SAO2 % BLDA FROM PO2: 93 % (ref 94–100)
SODIUM SERPL-SCNC: 139 MMOL/L (ref 135–145)
VENTILATION MODE VENT: NO
WBC # BLD AUTO: 12.2 10^3/UL (ref 4.3–11)

## 2021-01-25 RX ADMIN — CEFDINIR SCH MG: 300 CAPSULE ORAL at 08:53

## 2021-01-25 RX ADMIN — DOCUSATE SODIUM SCH MG: 100 CAPSULE ORAL at 20:05

## 2021-01-25 RX ADMIN — POLYETHYLENE GLYCOL (3350) SCH GM: 17 POWDER, FOR SOLUTION ORAL at 13:07

## 2021-01-25 RX ADMIN — CEFDINIR SCH MG: 300 CAPSULE ORAL at 20:05

## 2021-01-25 RX ADMIN — DOCUSATE SODIUM AND SENNOSIDES SCH EA: 8.6; 5 TABLET, FILM COATED ORAL at 13:07

## 2021-01-25 RX ADMIN — GUAIFENESIN AND CODEINE PHOSPHATE PRN ML: 100; 10 SOLUTION ORAL at 18:39

## 2021-01-25 RX ADMIN — FAMOTIDINE SCH MG: 20 TABLET, FILM COATED ORAL at 08:53

## 2021-01-25 RX ADMIN — DOCUSATE SODIUM AND SENNOSIDES SCH EA: 8.6; 5 TABLET, FILM COATED ORAL at 20:06

## 2021-01-25 RX ADMIN — POLYETHYLENE GLYCOL (3350) SCH GM: 17 POWDER, FOR SOLUTION ORAL at 20:05

## 2021-01-25 RX ADMIN — ENOXAPARIN SODIUM SCH MG: 100 INJECTION SUBCUTANEOUS at 08:53

## 2021-01-25 RX ADMIN — DOCUSATE SODIUM SCH MG: 100 CAPSULE ORAL at 13:07

## 2021-01-25 RX ADMIN — METOPROLOL TARTRATE SCH MG: 50 TABLET, FILM COATED ORAL at 08:53

## 2021-01-25 RX ADMIN — FLUTICASONE PROPIONATE AND SALMETEROL XINAFOATE SCH PUFF: 115; 21 AEROSOL, METERED RESPIRATORY (INHALATION) at 10:21

## 2021-01-25 RX ADMIN — FLUTICASONE PROPIONATE AND SALMETEROL XINAFOATE SCH PUFF: 115; 21 AEROSOL, METERED RESPIRATORY (INHALATION) at 21:23

## 2021-01-25 RX ADMIN — METOPROLOL TARTRATE SCH MG: 50 TABLET, FILM COATED ORAL at 20:05

## 2021-01-25 RX ADMIN — MIRTAZAPINE SCH MG: 15 TABLET, ORALLY DISINTEGRATING ORAL at 20:05

## 2021-01-25 NOTE — PHYSICAL THERAPY DAILY NOTE
PT Daily Note-Current


Subjective


Patient in recliner pre tx, agrees to PT, has no complaints of pain, patient 

states he is looking forward to going home.





Appearance


Patient in recliner post tx with nurse call, phone, tray, all needs met.





Mental Status


Patient Orientation:  Normal For Age





Transfers


SCALE: Activities may be completed with or without assistive devices.





6-Indepedent-patient completes the activity by him/herself with no assistance 

from a helper.


5-Set-up or Clean-up Assistance-helper sets up or cleans up; patient completes 

activity. [place] assists only prior to or  


    following the activity.


4-Supervision or Touching Assistance-helper provides verbal cues and/or 

touching/steadying and/or contact guard assistance as patient completes 

activity. Assistance may be provided   


    throughout the activity or intermittently.


3-Partial/Moderate Assistance-helper does LESS THAN HALF the effort. [place] 

lifts, holds or supports trunk or limbs, but provides less than half the effort.


2-Substantial/Maximal Assistance-helper does MORE THAN HALF the effort. [place] 

lifts or holds trunk or limbs and provides more than half the effort.


0-Jsodxwzql-uesjvw does ALL the effort. Patient does none of the effort to 

complete the activity. Or, the assistance of 2 or more helpers is required for 

the patient to complete the  


    activity.


If activity was not attempted, code reason:


7-Patient Refused.


9-Not Applicable-not attempted and the patient did not perform the activity 

before the current illness, exacerbation or injury.


10-Not Attempted due to Environmental Limitations-(lack of equipment, weather 

restraints, etc.).


88-Not Attempted due to Medical Conditions or Safety Concerns.


Sit to Stand (QC):  4


Chair/Bed-to-Chair Xfer(QC):  4


SBA





Weight Bearing


Right Lower Extremity:  Right


Full Weight Bearing


Left Lower Extremity:  Left


Full Weight Bearing





Gait Training


Distance:  120'x2


Walk 10 feet (QC):  4


Walk 50 ft with 2 Turns(QC):  4


Gait Persons Needed:  1


Gait Assistive Device:  FWW


SBA with a rolling walker, narrow base of support, very SOB after ambulation.  

Patient also ambulated back and forth in the parallel bars, without hold in onto

the bars, hands hovering above the bars for a total of about 40', no LOB or knee

buckling.





Exercises


NuStep Minutes:  15


 NuStep Workload:  5





Treatments


transfers, ambulation, LE strengthening





Assessment


Current Status:  Fair Progress


patient is making progress with functional mobility and strength but still has 

very poor endurance and gets very SOB with activity, needs frequent rest breaks





PT Short Term Goals


Short Term Goals


Time Frame:  2021


Roll Left & Right:  6


Sit to lyin


Lying to sitting on side of be:  6


Sit to stand:  6


Chair/bed-to-chair transfer:  6





PT Long Term Goals


Long Term Goals


PT Long Term Goals Time Frame:  Feb 3, 2021


Roll Left & Right (QC):  6


Sit to Lying (QC):  6


Lying-Sitting on Side/Bed(QC):  6


Sit to Stand (QC):  6


Chair/Bed-to-Chair Xfer(QC):  6


Toilet Transfer (QC):  6


Car Transfer (QC):  5


Does the Patient Walk:  No and Walking Goal IS indicated


Walk 10 feet (QC):  6


Walk 50ft with 2 Turns (QC):  6


Walk 150 ft (QC):  6


Walking 10ft on Uneven Surface:  6


1 Step (curb) (QC):  6


4 Steps (QC):  5


12 Steps (QC):  5


Picking up an Object (QC):  6


Does the Pt use WC or Scooter?:  No


Wheel 50 feet with 2 turns (QC:  9


Type:  N/A


Wheel 150 feet:  9


Type:  N/A





PT Plan


Problem List


Problem List:  Activity Tolerance, Functional Strength, Safety, Balance, Gait, 

Transfer





Treatment/Plan


Treatment Plan:  Continue Plan of Care


Treatment Plan:  Bed Mobility, Education, Functional Activity [place], Functional 

Strength, Group Therapy, Gait, Safety, Therapeutic Exercise, Transfers


Treatment Duration:  Feb 3, 2021


Frequency:  Modified Program (IRF)


Estimated Hrs Per Day:  Other


Patient and/or Family Agrees t:  Yes





Safety Risks/Education


Patient Education:  Gait Training, Transfer Techniques, Correct Positioning, 

Safety Issues


Teaching Recipient:  Patient


Teaching Methods:  Demonstration, Discussion


Response to Teaching:  Reinforcement Needed





Time/GCodes


Time In:  1100


Time Out:  1200


Total Billed Treatment Time:  60


Total Billed Treatment


1 visit


GT 45'


EX 15'











LISA WOOD PT                2021 11:56

## 2021-01-25 NOTE — PM&R PROGRESS NOTE
Subjective


HPI/CC On Admission


Date Seen by Provider:  2021


Time Seen by Provider:  08:30


Subjective/Events-last exam


21:


Pt still having a lot of SOB


Requiring oxygen


CT scan shows Covid lungs


Consulting Dr. Sofía RIVAS ordered


Steroids will continue 








21:


Felt like he has a "cold"


Noted O2 requirements now 1L/min


CXR revealed COVID lungs


Difficult to say whether he has bacterial PNA superimposed


Labs checked


Increase steroids


Added bronchitis abx








21:


Patient is doing well


Ambulating well


Talked about COVID a lot today and his hospital course prior to coming here








21:


Much improved status


Monitor O2


Monitor pain


Walking well








21:


Bowels moved today


Urinating pretty well


Denies any significant new concerns


Gaining strength








21:


Bowels are moving 


Doing very well 


Dramatic improvement walking 


Overall no significant issues








21:


Pt doing pretty well


Slept well


Denies any significant new issues


Participating in therapy








21:


Pt slept really well 


Doing well 


No pain 


Getting stronger 


Monitoring closely








21:


Did not sleep well so willing to try Remeron 15mg and Melatonin


Motivated


Scoliosis noted


Ankle weights provided to help strengthening








21:


Pastoral care visited him and that was helpful given 3 immediate family members 

have  in the past 1 month


Trach and PEG dressing changes


Aleven on heels


Gaining strength








1/15/21:


Pastoral care notified


Family members 3 of them have  within 2 months


Tremors noted


Heels sore


PEG  placed








21:


Wants PEG tube removed


History of placement 20


No issues


Up in chair today


Tachycardia noted and on BB


NO pain





Review of Systems


General:  Fatigue


Pulmonary:  Dyspnea, Cough





Focused Exam


Lactate Level


21 15:47: Lactic Acid Level 2.00








Objective


Exam


Vital Signs





Vital Signs








  Date Time  Temp Pulse Resp B/P (MAP) Pulse Ox O2 Delivery O2 Flow Rate FiO2


 


21 18:40     95 Nasal Cannula 1.00 


 


21 18:29 36.3 92 20 137/81 (99)    





Capillary Refill :


General Appearance:  No Apparent Distress, WD/WN, Anxious, Thin


HEENT:  PERRL/EOMI, Normal ENT Inspection, Pharynx Normal


Neck:  Full Range of Motion, Normal Inspection, Non Tender, Supple, Carotid 

Bruit


Respiratory:  Chest Non Tender, Lungs Clear, No Accessory Muscle Use, No 

Respiratory Distress, Decreased Breath Sounds


Cardiovascular:  Regular Rate, Rhythm, No Edema, No Gallop, No JVD, No Murmur, 

Normal Peripheral Pulses


Gastrointestinal:  Normal Bowel Sounds, No Organomegaly, No Pulsatile Mass, Non 

Tender, Soft


Back:  Normal Inspection, No CVA Tenderness, No Vertebral Tenderness


Extremity:  Normal Capillary Refill, Normal Inspection, Normal Range of Motion, 

Non Tender, No Calf Tenderness, No Pedal Edema


Neurologic/Psychiatric:  Alert, Oriented x3, No Motor/Sensory Deficits, Abnormal

Gait, Depressed Affect, Motor Weakness (generalized all extremities)


Skin:  Normal Color, Warm/Dry


Lymphatic:  No Adenopathy





Results/Procedures


Lab


Laboratory Tests


21 09:40








Patient resulted labs reviewed.





FIM


Transfers


Therapy Code Descriptions/Definitions 





Functional [place] Measure:


0=Not Assessed/NA        4=Minimal Assistance


1=Total Assistance        5=Supervision or Setup


2=Maximal Assistance  6=Modified [place]


3=Moderate Assistance 7=Complete IndependenceSCALE: Activities may be completed 

with or without assistive devices.





6-Indepedent-patient completes the activity by him/herself with no assistance 

from a helper.


5-Set-up or Clean-up Assistance-helper sets up or cleans up; patient completes 

activity. [place] assists only prior to or  


    following the activity.


4-Supervision or Touching Assistance-helper provides verbal cues and/or 

touching/steadying and/or contact guard assistance as patient completes 

activity. Assistance may be provided   


    throughout the activity or intermittently.


3-Partial/Moderate Assistance-helper does LESS THAN HALF the effort. [place] 

lifts, holds or supports trunk or limbs, but provides less than half the effort.


2-Substantial/Maximal Assistance-helper does MORE THAN HALF the effort. [place] 

lifts or holds trunk or limbs and provides more than half the effort.


6-Hhyqhjzgy-kntgmm does ALL the effort. Patient does none of the effort to 

complete the activity. Or, the assistance of 2 or more helpers is required for 

the patient to complete the  


    activity.


If activity was not attempted, code reason:


7-Patient Refused.


9-Not Applicable-not attempted and the patient did not perform the activity 

before the current illness, exacerbation or injury.


10-Not Attempted due to Environmental Limitations-(lack of equipment, weather 

restraints, etc.).


88-Not Attempted due to Medical Conditions or Safety Concerns.


Roll Left to Right (QC):  6


Sit to Lying (QC):  5


Sit to Stand (QC):  4


Chair/Bed-to-Chair Xfer(QC):  4


Car Transfer (QC):  88





Gait Training


Does the Patient Walk?:  Yes


Distance:  120'x2


Walk 10 feet (QC):  4


Walk 50 ft with 2 Turns(QC):  4


Walk 150 ft (QC):  3 (CGA for safety)


Walking 10ft/uneven surface-QC:  88


Gait Persons Needed:  1


Gait Assistive Device:  FWW





Wheelchair Training


Does the Pt Use a Wheelchair?:  Yes


Wheel 50 ft with 2 turns (QC):  5


Wheel 150 ft (QC):  5


Type of Wheelchair:  Manual





Stair Training


1 Step (curb) (QC):  88


4 Steps (QC):  88


12 Steps (QC):  88





Balance


Picking up an Object (QC):  88





ADL-Treatment


Eating (QC):  5 (Assist opening lemonade packet, pouring into container, and 

filling cup with tea. Pt then able to take a drink independently.)


Oral Hygiene (QC):  5 (Set up)


Shower/Bathe Self (QC):  4 (Pt able to wash/dry all parts seated on SC, 

supervision in stand at [facility].)


Upper Body Dressing (QC):  5 (set up)


Lower Body Dressing (QC):  3 (Rick, pt able to doff Independently. Pt able to 

don R legs and perform pants hike while standing. Assistance to thread L leg)


On/Off Footwear (QC):  3 (pt able to doff socks and don R foot. Assistance 

needed for L leg)


Toileting Hygiene (QC):  2 (Pt able to manage clothing down, assist with hygiene

and pant hike.)


Toilet Transfer (QC):  4 (CGA on/off BSC.)





Assessment/Plan


Assessment and Plan


Assess & Plan/Chief Complaint


Assessment:


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


Anemia


s/p trach now DC


Steroids tapered


Scoliosis





Plan:


IRF protocol


Increase ADL independence


Ambulate


O2 monitoring





21:


PEG tube when able to DC will DC


Monitor BP and HR


Monitor O2





1/15/21:


Supportive care


Monitor closely





21:


Monitor closely


BP stable


Pastoral care visit





21:


Insomnia treatment


Monitor closely


Pain management





21:


Monitor insomnia


Therapy


Wean O2





21:


Monitor O2 during exertion


Insomnia treatment


Remeron also helps depression





21:


Monitor insomnia


Continue treatment


Declines pain meds for scoliosis





21:


Improved ambulation


Improved dyspnea


Overall dramatic improvement





21:


Monitor O2


Increase ambulation





21:


Ambulate


O2





21:


Increase steroid dose


Abx


Monitor O2


Labs





21:


CT chest


Abx


Steroids


Consult Dr Gore





(1) Myopathy


(2) History of tracheostomy


(3) Frailty


(4) Cough


(5) Anemia


(6) COVID-19











THOR MATHEWS DO                2021 08:38

## 2021-01-25 NOTE — PULMONARY CONSULTATION
History of Present Illness


History of Present Illness


Date Seen by Provider:  Jan 25, 2021


Date of Admission








Allergies and Home Medications


Allergies


Coded Allergies:  


     No Known Drug Allergies (Unverified , 1/13/21)





Home Medications


No Active Prescriptions or Reported Meds





Past Medical-Social-Family Hx


Past Med/Social Hx:  Reviewed Nursing Past Med/Soc Hx, Reviewed and Corrections 

made


Patient Social History


Alcohol Use:  Denies Use


Smoking Status:  Never a Smoker


Have you traveled recently?:  No


Alcohol Use?:  No





Past Medical History


Musculoskeletal:  Yes


Scoliosis





Sepsis Event


Evaluation


Height, Weight, BMI


Height: '"


Weight: lbs. oz. kg; 26.00 BMI


Method:





Exam


Exam





Vital Signs








  Date Time  Temp Pulse Resp B/P (MAP) Pulse Ox O2 Delivery O2 Flow Rate FiO2


 


1/25/21 10:21     95 Room Air  


 


1/25/21 08:36      Room Air  


 


1/25/21 06:00 36.5 94 18 122/79 (93) 93 Nasal Cannula  


 


1/24/21 20:30     90 Room Air  


 


1/24/21 18:18     90 Room Air  


 


1/24/21 18:01 36.8 97 16 117/75 (89) 91 Nasal Cannula 1.00 


 


1/24/21 13:20  90 18 122/79 (93) 90 Room Air  














I & O0 


 


 1/25/21





 07:00


 


Intake Total 1300 ml


 


Output Total 1325 ml


 


Balance -25 ml








Height & Weight


Height: '"


Weight: lbs. oz. kg; 26.00 BMI


Method:


General Appearance:  No Apparent Distress, WD/WN, Anxious, Thin


HEENT:  PERRL/EOMI, Normal ENT Inspection, Pharynx Normal


Neck:  Full Range of Motion, Normal Inspection, Non Tender, Supple, Carotid 

Bruit


Respiratory:  Chest Non Tender, Lungs Clear, No Accessory Muscle Use, No 

Respiratory Distress, Decreased Breath Sounds


Cardiovascular:  Regular Rate, Rhythm, No Edema, No Gallop, No JVD, No Murmur, 

Normal Peripheral Pulses


Extremity:  Normal Capillary Refill, Normal Inspection, Normal Range of Motion, 

Non Tender, No Calf Tenderness, No Pedal Edema


Neurologic/Psychiatric:  Alert, Oriented x3, No Motor/Sensory Deficits, Abnormal

Gait, Depressed Affect, Motor Weakness


Skin:  Normal Color, Warm/Dry


Lymphatic:  No Adenopathy





Results


Lab


Laboratory Tests


1/24/21 15:47








1/25/21 09:40











Assessment/Plan


Assessment/Plan


recovering COVID


   -continue prednisone 


   -Oxygen 


   -Labs and CXR reviwed 


PNA 


   -Continue Cefepime 


Debility











CHRISTI MAS DO              Jan 25, 2021 13:10

## 2021-01-25 NOTE — DIAGNOSTIC IMAGING REPORT
PROCEDURE: CT angiography of the chest with contrast.



TECHNIQUE: Multiple contiguous axial images were obtained through

the chest after uneventful bolus administration of intravenous

contrast. 3D reconstructed CTA MIP acquisitions were also

performed.

Auto Exposure Controls were utilized during the CT exam to meet

ALARA standards for radiation dose reduction. 



 

INDICATION:  Covid. Dyspnea.



FINDINGS: On a technical basis, there is suboptimal opacification

of the pulmonary arterial branches. No identifiable central or

lobar PE. At and beyond the segmental level, luminal

opacification is nondiagnostic. There were no secondary findings

to suggest underlying PE. There is no evidence for elevated right

heart pressures. No findings of pulmonary hemorrhage or lung

infarct. This 5 lobe groundglass pulmonary opacities while

nonspecific are often seen in the setting of Covid. No bullous

disease, blebs or bronchiectasis. No effusion, pneumothorax or

pneumomediastinum. No lung mass or thoracic lymphadenopathy. No

acute chest wall pathology. There is a PEG tube in the stomach.

The upper abdomen appearing nonacute.



IMPRESSION: No identifiable proximal PE. 5 lobe groundglass

pulmonary opacities suspicious for viral infectious etiology.



Dictated by: 



  Dictated on workstation # [id number]

## 2021-01-25 NOTE — OCCUPATIONAL THER DAILY NOTE
OT Current Status-Daily Note


Subjective


Pt AxO, in bed upon entry. Pt agrees to OT tx, states slept poorly. Pt agrees to

sponge bath, stating he showered yesterday. Pt denies pain, is SOB 

intermittently through session. 02 monitored throughout.





Mental Status/Objective


Patient Orientation:  Person, Place, Situation, Normal For Age





ADL-Treatment


Therapy Code Descriptions/Definitions 





Functional [place] Measure:


0=Not Assessed/NA        4=Minimal Assistance


1=Total Assistance        5=Supervision or Setup


2=Maximal Assistance  6=Modified [place]


3=Moderate Assistance 7=Complete IndependenceSCALE: Activities may be completed 

with or without assistive devices.





6-Indepedent-patient completes the activity by him/herself with no assistance 

from a helper.


5-Set-up or Clean-up Assistance-helper sets up or cleans up; patient completes 

activity. [place] assists only prior to or  


    following the activity.


4-Supervision or Touching Assistance-helper provides verbal cues and/or 

touching/steadying and/or contact guard assistance as patient completes 

activity. Assistance may be provided   


    throughout the activity or intermittently.


3-Partial/Moderate Assistance-helper does LESS THAN HALF the effort. [place] 

lifts, holds or supports trunk or limbs, but provides less than half the effort.


2-Substantial/Maximal Assistance-helper does MORE THAN HALF the effort. [place] 

lifts or holds trunk or limbs and provides more than half the effort.


9-Mfxuwrqbs-fdlfyc does ALL the effort. Patient does none of the effort to 

complete the activity. Or, the assistance of 2 or more helpers is required for 

the patient to complete the  


    activity.


If activity was not attempted, code reason:


7-Patient Refused.


9-Not Applicable-not attempted and the patient did not perform the activity 

before the current illness, exacerbation or injury.


10-Not Attempted due to Environmental Limitations-(lack of equipment, weather 

restraints, etc.).


88-Not Attempted due to Medical Conditions or Safety Concerns.


Eating (QC):  6


Oral Hygiene (QC):  6 (completes with IND with items placed in front of pt.)


Bathing Location:  L Arm, R Arm, L Upper Leg, R Upper Leg, L Lower Leg 

(including foot), R Lower Leg (including foot), Chest, Abdomen, Buttocks, 

Perineal Area


Shower/Bathe Self (QC):  4 (SUP during all tasks. Pt completes EOB, breaks for 

breath. )


Upper Body Dressing (QC):  5 (s/u)


Lower Body Dressing (QC):  4 (SUP)


On/Off Footwear:  6 (IND EOB)


Toileting Hygiene (QC):  4 (SUP in stance for bottom care. )


Toilet Transfer (QC):  7 (denies needs for toileting.)





Other Treatment


Pt completes bed mob supine to sit with SUP. Completes all bathing/ dressing 

tasks EOB with SUP. Pt rests EOB post-dressing tasks, 02 monitored after bending

tasks with high80's found. Pt's 1L 02 placed on pt. Able to recover after ~2 

min. Pt completes ambulation to gym without rest break, pt's 02 high 60's/ low 

70's. Pt educated on this, reconnected to 1L 02, recovers within 4 min to low 

90's. Pt completes 2 min low resistance arm bike, 02 assessed at 88% post task, 

2 min recovery to reach low 90's. Pt educated on rest breaks throughout the 

home/ gathering breath and knowing limits. Pt states he will only have to 

ambulate ~1/2 distance from room to gym while at home. Pt educated to complete 

rest break when ambulating back to room to prepare for this activity and check 

02 levels. When ambulating back to room, pt denies break, stating he desires to 

go all the way. Pt is encouraged to complete break, pt denies. Sits in recliner.

Pt's 02 mid 70's end of session, 1 L donned, recovers within 3 min to low 90's. 

Pt is educated on safety within home and COVID's role with oxygen diffusion. Pt 

states he knows, is again encouraged to take more rest breaks. All needs met, 

call light in reach. Pt in recliner end of session with 02 donned.





Education


OT Patient Education:  Correct positioning, Exercise program, Purpose of 

tx/functional activities, Safety issues, Transfer techniques


Teaching Recipient:  Patient


Teaching Methods:  Demonstration, Discussion


Response to Teaching:  Verbalize Understanding, Return Demonstration, 

Reinforcement Needed





OT Short Term Goals


Short Term Goals


Time Frame:  2021


Eatin


Oral hygiene:  5


Toileting hygiene:  4


Shower/bathe self:  4


Upper body dressin


Lower body dressin


Putting on/taking off footwear:  4





OT Long Term Goals


Long Term Goals


Time Frame:  Feb 10, 2021


Eating (QC):  6


Oral Hygiene (QC):  6


Toileting Hygiene (QC):  6


Shower/Bathe Self (QC):  6


Upper Body Dressing (QC):  6


Lower Body Dressing (QC):  6


On/Off Footwear (QC):  6


Additional Goals:  1-Demonstrate ADL Tasks, 2-Verbalize Understanding, 3-

ImproveStrength/[place]


1=Demonstrate adherence to instructed precautions during ADL tasks.


2=Patient will verbalize/demonstrate understanding of assistive 

devices/modifications for ADL.


3=Patient will improve strength/tolerance for activity to enable patient to 

perform ADL's.





OT Education/Plan


Problem List/Assessment


Assessment:  Decreased Activ Tolerance, Decreased Safety Aware, Decreased UE 

Strength, Dependent Transfers, Impaired I ADL's, Impaired Self-Care Skills





Discharge Recommendations


Plan/Recommendations:  Continue POC


Therapy Discharge Recommendati:  Intermittent Supervision, Home & Family, Post 

Acute OT





Treatment Plan/Plan of Care


Treatment,Training & Education:  Yes


Patient would benefit from OT for education, treatment and training to promote 

independence in ADL's, mobility, safety and/or upper extremity function for 

ADL's.


Plan of Care:  ADL Retraining, Caregiver Training, Concurrent Therapy, 

Functional Mobility, Group Exercise/Act as Ind, UE Funct Exercise/Act, UE 

Neuromus Re-Ed/Coord


Treatment Duration:  2021


Frequency:  Modified Program (IRF)


Estimated Hrs Per Day:  1 hour per day


Agreement:  Yes


Rehab Potential:  Good





Time/GCodes


Start Time:  08:00


Stop Time:  09:00


Total Time Billed (hr/min):  60


Billed Treatment Time


1, ADL 2 (30), FA (15), EX (15)= 60











WILFRID FRAZIER OTR                2021 12:32

## 2021-01-25 NOTE — OCCUPATIONAL THER DAILY NOTE
OT Current Status-Daily Note


Subjective


Pt seated in recliner at beginning of tx, agreeable to OT tx.





Mental Status/Objective


Patient Orientation:  Person, Place, Time, Situation





ADL-Treatment


Therapy Code Descriptions/Definitions 





Functional [place] Measure:


0=Not Assessed/NA        4=Minimal Assistance


1=Total Assistance        5=Supervision or Setup


2=Maximal Assistance  6=Modified [place]


3=Moderate Assistance 7=Complete IndependenceSCALE: Activities may be completed 

with or without assistive devices.





6-Indepedent-patient completes the activity by him/herself with no assistance 

from a helper.


5-Set-up or Clean-up Assistance-helper sets up or cleans up; patient completes 

activity. [place] assists only prior to or  


    following the activity.


4-Supervision or Touching Assistance-helper provides verbal cues and/or 

touching/steadying and/or contact guard assistance as patient completes 

activity. Assistance may be provided   


    throughout the activity or intermittently.


3-Partial/Moderate Assistance-helper does LESS THAN HALF the effort. [place] 

lifts, holds or supports trunk or limbs, but provides less than half the effort.


2-Substantial/Maximal Assistance-helper does MORE THAN HALF the effort. [place] 

lifts or holds trunk or limbs and provides more than half the effort.


3-Wyftykdlj-noilzl does ALL the effort. Patient does none of the effort to 

complete the activity. Or, the assistance of 2 or more helpers is required for 

the patient to complete the  


    activity.


If activity was not attempted, code reason:


7-Patient Refused.


9-Not Applicable-not attempted and the patient did not perform the activity 

before the current illness, exacerbation or injury.


10-Not Attempted due to Environmental Limitations-(lack of equipment, weather 

restraints, etc.).


88-Not Attempted due to Medical Conditions or Safety Concerns.





Other Treatment


Pt began tx in recliner and ambulated to therapy gym with FWW at [facility] ([place] Pt 

describes the want to get stronger and wanting to lift actual weights. In 

therapy gym, pt participated in exercises with 2lb at 10x reps each and did 

exercises 2x through with a rest break after each exercise. Pt did shoulder 

flexion, elbow flexion/extension, and shoulder adduction. Pt described no pain. 

Pt ambulated back to room with FWW at [facility] ([place] Pt describes the want to go back home

and wanting to push on with therapy. OT mentions that she does not want him to 

push too hard through tx and end up hurting himself or making himself sore for 

the next day. Pt is left with 2lb dumbbells in room as he wants. Pt ended tx 

seated in recliner with call light in reach and all needs met.





Education


OT Patient Education:  Energy conservation, Exercise program, Modified ADL 

techniques, Progress toward Goal/Update tx plan, Purpose of tx/functional 

activities, Reviewed precautions, Safety issues


Teaching Recipient:  Patient


Teaching Methods:  Demonstration, Discussion


Response to Teaching:  Verbalize Understanding





OT Short Term Goals


Short Term Goals


Time Frame:  2021


Eatin


Oral hygiene:  5


Toileting hygiene:  4


Shower/bathe self:  4


Upper body dressin


Lower body dressin


Putting on/taking off footwear:  4





OT Long Term Goals


Long Term Goals


Time Frame:  Feb 10, 2021


Eating (QC):  6


Oral Hygiene (QC):  6


Toileting Hygiene (QC):  6


Shower/Bathe Self (QC):  6


Upper Body Dressing (QC):  6


Lower Body Dressing (QC):  6


On/Off Footwear (QC):  6


Additional Goals:  1-Demonstrate ADL Tasks, 2-Verbalize Understanding, 3-

ImproveStrength/[place]


1=Demonstrate adherence to instructed precautions during ADL tasks.


2=Patient will verbalize/demonstrate understanding of assistive 

devices/modifications for ADL.


3=Patient will improve strength/tolerance for activity to enable patient to 

perform ADL's.





OT Education/Plan


Problem List/Assessment


Assessment:  Decreased Activ Tolerance, Decreased UE Strength, Impaired Funct Ba

teena, Impaired I ADL's, Impaired Self-Care Skills, Restricted Funct UE ROM





Discharge Recommendations


Plan/Recommendations:  Continue POC





Treatment Plan/Plan of Care


Patient would benefit from OT for education, treatment and training to promote 

independence in ADL's, mobility, safety and/or upper extremity function for 

ADL's.


Plan of Care:  ADL Retraining, Caregiver Training, Concurrent Therapy, 

Functional Mobility, Group Exercise/Act as Ind, UE Funct Exercise/Act, UE 

Neuromus Re-Ed/Coord


Treatment Duration:  2021


Frequency:  Modified Program (IRF)


Estimated Hrs Per Day:  1 hour per day


Agreement:  Yes


Rehab Potential:  Good





Time/GCodes


Start Time:  13:00


Stop Time:  13:30


Total Time Billed (hr/min):  30


Billed Treatment Time


1, EX 2 (30)











GADIEL MAS OT          2021 13:37

## 2021-01-25 NOTE — PHYSICAL THERAPY DAILY NOTE
PT Daily Note-Current


Subjective


Patient in recliner pre tx, agrees to PT, has no complaints of pain





Appearance


Patient in recliner post tx with nurse call, phone, tray, all needs met.  

Discussed with nurse and patient is doing much better with balance and stability

and functional mobility, will make him independent in his room now.





Mental Status


Patient Orientation:  Person, Place, Situation





Transfers


SCALE: Activities may be completed with or without assistive devices.





6-Indepedent-patient completes the activity by him/herself with no assistance 

from a helper.


5-Set-up or Clean-up Assistance-helper sets up or cleans up; patient completes 

activity. [place] assists only prior to or  


    following the activity.


4-Supervision or Touching Assistance-helper provides verbal cues and/or 

touching/steadying and/or contact guard assistance as patient completes 

activity. Assistance may be provided   


    throughout the activity or intermittently.


3-Partial/Moderate Assistance-helper does LESS THAN HALF the effort. [place] lif

ts, holds or supports trunk or limbs, but provides less than half the effort.


2-Substantial/Maximal Assistance-helper does MORE THAN HALF the effort. [place] 

lifts or holds trunk or limbs and provides more than half the effort.


2-Kxxbvoetf-ufeszd does ALL the effort. Patient does none of the effort to 

complete the activity. Or, the assistance of 2 or more helpers is required for 

the patient to complete the  


    activity.


If activity was not attempted, code reason:


7-Patient Refused.


9-Not Applicable-not attempted and the patient did not perform the activity 

before the current illness, exacerbation or injury.


10-Not Attempted due to Environmental Limitations-(lack of equipment, weather 

restraints, etc.).


88-Not Attempted due to Medical Conditions or Safety Concerns.


Sit to Stand (QC):  6


Chair/Bed-to-Chair Xfer(QC):  6





Weight Bearing


Right Lower Extremity:  Right


Full Weight Bearing


Left Lower Extremity:  Left


Full Weight Bearing





Gait Training


Distance:  120'x2


Walk 10 feet (QC):  6


Walk 50 ft with 2 Turns(QC):  6


Gait Assistive Device:  FWW





Exercises


sit to stand from therapy mat 2 sets of 10





Treatments


transfers, ambulation, LE exercise, patient demonstrated that he can ambulate 

into the restroom and sit on toilet with independence





Assessment


Current Status:  Fair Progress


good progress with functional mobility and strength but still gets very SOB with

activity and needs frequent rest breaks





PT Short Term Goals


Short Term Goals


Time Frame:  2021


Roll Left & Right:  6


Sit to lyin


Lying to sitting on side of be:  6


Sit to stand:  6


Chair/bed-to-chair transfer:  6





PT Long Term Goals


Long Term Goals


PT Long Term Goals Time Frame:  Feb 3, 2021


Roll Left & Right (QC):  6


Sit to Lying (QC):  6


Lying-Sitting on Side/Bed(QC):  6


Sit to Stand (QC):  6


Chair/Bed-to-Chair Xfer(QC):  6


Toilet Transfer (QC):  6


Car Transfer (QC):  5


Does the Patient Walk:  No and Walking Goal IS indicated


Walk 10 feet (QC):  6


Walk 50ft with 2 Turns (QC):  6


Walk 150 ft (QC):  6


Walking 10ft on Uneven Surface:  6


1 Step (curb) (QC):  6


4 Steps (QC):  5


12 Steps (QC):  5


Picking up an Object (QC):  6


Does the Pt use WC or Scooter?:  No


Wheel 50 feet with 2 turns (QC:  9


Type:  N/A


Wheel 150 feet:  9


Type:  N/A





PT Plan


Problem List


Problem List:  Activity Tolerance, Functional Strength, Safety, Balance, Gait, 

Transfer





Treatment/Plan


Treatment Plan:  Continue Plan of Care


Treatment Plan:  Bed Mobility, Education, Functional Activity [place], Functional 

Strength, Group Therapy, Gait, Safety, Therapeutic Exercise, Transfers


Treatment Duration:  Feb 3, 2021


Frequency:  Modified Program (IRF)


Estimated Hrs Per Day:  Other


Patient and/or Family Agrees t:  Yes





Safety Risks/Education


Patient Education:  Gait Training, Transfer Techniques, Correct Positioning, 

Safety Issues


Teaching Recipient:  Patient


Teaching Methods:  Demonstration, Discussion


Response to Teaching:  Reinforcement Needed





Time/GCodes


Time In:  1415


Time Out:  1445


Total Billed Treatment Time:  30


Total Billed Treatment


1 visit


EX 10'


GT 20'











LISA WOOD PT                2021 14:50

## 2021-01-26 VITALS — SYSTOLIC BLOOD PRESSURE: 100 MMHG | DIASTOLIC BLOOD PRESSURE: 65 MMHG

## 2021-01-26 VITALS — DIASTOLIC BLOOD PRESSURE: 76 MMHG | SYSTOLIC BLOOD PRESSURE: 139 MMHG

## 2021-01-26 RX ADMIN — DOCUSATE SODIUM SCH MG: 100 CAPSULE ORAL at 20:39

## 2021-01-26 RX ADMIN — DOCUSATE SODIUM AND SENNOSIDES SCH EA: 8.6; 5 TABLET, FILM COATED ORAL at 20:39

## 2021-01-26 RX ADMIN — MIRTAZAPINE SCH MG: 15 TABLET, ORALLY DISINTEGRATING ORAL at 20:15

## 2021-01-26 RX ADMIN — CEFDINIR SCH MG: 300 CAPSULE ORAL at 09:56

## 2021-01-26 RX ADMIN — POLYETHYLENE GLYCOL (3350) SCH GM: 17 POWDER, FOR SOLUTION ORAL at 09:57

## 2021-01-26 RX ADMIN — DOCUSATE SODIUM SCH MG: 100 CAPSULE ORAL at 09:57

## 2021-01-26 RX ADMIN — FLUTICASONE PROPIONATE AND SALMETEROL XINAFOATE SCH PUFF: 115; 21 AEROSOL, METERED RESPIRATORY (INHALATION) at 18:47

## 2021-01-26 RX ADMIN — CEFDINIR SCH MG: 300 CAPSULE ORAL at 20:15

## 2021-01-26 RX ADMIN — DOCUSATE SODIUM AND SENNOSIDES SCH EA: 8.6; 5 TABLET, FILM COATED ORAL at 09:57

## 2021-01-26 RX ADMIN — METOPROLOL TARTRATE SCH MG: 50 TABLET, FILM COATED ORAL at 09:56

## 2021-01-26 RX ADMIN — ENOXAPARIN SODIUM SCH MG: 100 INJECTION SUBCUTANEOUS at 09:56

## 2021-01-26 RX ADMIN — FAMOTIDINE SCH MG: 20 TABLET, FILM COATED ORAL at 09:56

## 2021-01-26 RX ADMIN — POLYETHYLENE GLYCOL (3350) SCH GM: 17 POWDER, FOR SOLUTION ORAL at 20:39

## 2021-01-26 RX ADMIN — FLUTICASONE PROPIONATE AND SALMETEROL XINAFOATE SCH PUFF: 115; 21 AEROSOL, METERED RESPIRATORY (INHALATION) at 09:50

## 2021-01-26 RX ADMIN — METOPROLOL TARTRATE SCH MG: 50 TABLET, FILM COATED ORAL at 20:15

## 2021-01-26 NOTE — OCCUPATIONAL THER DAILY NOTE
OT Current Status-Daily Note


Subjective


Pt started tx laying in bed and agreeable to OT tx.





Mental Status/Objective


Patient Orientation:  Person, Place, Time, Situation





ADL-Treatment


Therapy Code Descriptions/Definitions 





Functional [place] Measure:


0=Not Assessed/NA        4=Minimal Assistance


1=Total Assistance        5=Supervision or Setup


2=Maximal Assistance  6=Modified [place]


3=Moderate Assistance 7=Complete IndependenceSCALE: Activities may be completed 

with or without assistive devices.





6-Indepedent-patient completes the activity by him/herself with no assistance 

from a helper.


5-Set-up or Clean-up Assistance-helper sets up or cleans up; patient completes 

activity. [place] assists only prior to or  


    following the activity.


4-Supervision or Touching Assistance-helper provides verbal cues and/or 

touching/steadying and/or contact guard assistance as patient completes 

activity. Assistance may be provided   


    throughout the activity or intermittently.


3-Partial/Moderate Assistance-helper does LESS THAN HALF the effort. [place] 

lifts, holds or supports trunk or limbs, but provides less than half the effort.


2-Substantial/Maximal Assistance-helper does MORE THAN HALF the effort. [place] 

lifts or holds trunk or limbs and provides more than half the effort.


9-Adqgenbsf-zokmkn does ALL the effort. Patient does none of the effort to 

complete the activity. Or, the assistance of 2 or more helpers is required for 

the patient to complete the  


    activity.


If activity was not attempted, code reason:


7-Patient Refused.


9-Not Applicable-not attempted and the patient did not perform the activity b

efore the current illness, exacerbation or injury.


10-Not Attempted due to Environmental Limitations-(lack of equipment, weather 

restraints, etc.).


88-Not Attempted due to Medical Conditions or Safety Concerns.


Eating (QC):  6 (per pt report, pt independent with task)


Shower/Bathe Self (QC):  5 (Set up for body wash and towels, pt able to wash all

body parts Independently)


Upper Body Dressing (QC):  6 (Independent, pt able to get clothes from closet 

and don and doff clothing)


Lower Body Dressing (QC):  6 (Pt able to get clothes from closet and don and 

doff clothing)


On/Off Footwear:  6 (Pt able to don and doff footwear)


Toileting Hygiene (QC):  6 (pt able to perform clothing management and hygiene 

independently)


Toilet Transfer (QC):  6 (pt able to transfer to toilet with FWW independently)





Other Treatment


Pt sat EOB to transfer to standing with FWW. Pt first ambulated and got clothes 

from pt's closet and then ambulated to bathroom. pt transferred to toilet and 

performed independent hygiene and then ambulated to shower with FWW. Pt showered

independently overall, but set up due to covering midline IV and feeding tube at

stomach. Pt ambulated from bathroom to recliner with FWW. Post tx, pt in 

recliner with call light in reach and all needs met.





Education


OT Patient Education:  Energy conservation, Exercise program, Modified ADL 

techniques, Progress toward Goal/Update tx plan, Purpose of tx/functional 

activities, Reviewed precautions, Safety issues, Transfer techniques


Teaching Recipient:  Patient


Teaching Methods:  Discussion


Response to Teaching:  Verbalize Understanding





OT Short Term Goals


Short Term Goals


Time Frame:  2021


Eatin


Oral hygiene:  5


Toileting hygiene:  4


Shower/bathe self:  4


Upper body dressin


Lower body dressin


Putting on/taking off footwear:  4





OT Long Term Goals


Long Term Goals


Time Frame:  Feb 10, 2021


Eating (QC):  6


Oral Hygiene (QC):  6


Toileting Hygiene (QC):  6


Shower/Bathe Self (QC):  6


Upper Body Dressing (QC):  6


Lower Body Dressing (QC):  6


On/Off Footwear (QC):  6


Additional Goals:  1-Demonstrate ADL Tasks, 2-Verbalize Understanding, 

3-ImproveStrength/[place]


1=Demonstrate adherence to instructed precautions during ADL tasks.


2=Patient will verbalize/demonstrate understanding of assistive 

devices/modifications for ADL.


3=Patient will improve strength/tolerance for activity to enable patient to 

perform ADL's.





OT Education/Plan


Problem List/Assessment


Assessment:  Decreased Activ Tolerance, Impaired Funct Balance, Restricted Funct

UE ROM





Discharge Recommendations


Plan/Recommendations:  Continue POC





Treatment Plan/Plan of Care


Patient would benefit from OT for education, treatment and training to promote 

independence in ADL's, mobility, safety and/or upper extremity function for 

ADL's.


Plan of Care:  ADL Retraining, Caregiver Training, Concurrent Therapy, 

Functional Mobility, Group Exercise/Act as Ind, UE Funct Exercise/Act, UE 

Neuromus Re-Ed/Coord


Treatment Duration:  2021


Frequency:  Modified Program (IRF)


Estimated Hrs Per Day:  1 hour per day


Agreement:  Yes


Rehab Potential:  Good





Time/GCodes


Start Time:  08:00


Stop Time:  09:00


Total Time Billed (hr/min):  60


Billed Treatment Time


1, ADL 4











GADIEL MAS OT          2021 09:04

## 2021-01-26 NOTE — OCCUPATIONAL THER DAILY NOTE
OT Current Status-Daily Note


Subjective


Pt agreeable to OT tx and reports no pain.





ADL-Treatment


Therapy Code Descriptions/Definitions 





Functional [place] Measure:


0=Not Assessed/NA        4=Minimal Assistance


1=Total Assistance        5=Supervision or Setup


2=Maximal Assistance  6=Modified [place]


3=Moderate Assistance 7=Complete IndependenceSCALE: Activities may be completed 

with or without assistive devices.





6-Indepedent-patient completes the activity by him/herself with no assistance 

from a helper.


5-Set-up or Clean-up Assistance-helper sets up or cleans up; patient completes 

activity. [place] assists only prior to or  


    following the activity.


4-Supervision or Touching Assistance-helper provides verbal cues and/or 

touching/steadying and/or contact guard assistance as patient completes 

activity. Assistance may be provided   


    throughout the activity or intermittently.


3-Partial/Moderate Assistance-helper does LESS THAN HALF the effort. [place] 

lifts, holds or supports trunk or limbs, but provides less than half the effort.


2-Substantial/Maximal Assistance-helper does MORE THAN HALF the effort. [place] 

lifts or holds trunk or limbs and provides more than half the effort.


8-Uziohcart-zyluon does ALL the effort. Patient does none of the effort to 

complete the activity. Or, the assistance of 2 or more helpers is required for 

the patient to complete the  


    activity.


If activity was not attempted, code reason:


7-Patient Refused.


9-Not Applicable-not attempted and the patient did not perform the activity 

before the current illness, exacerbation or injury.


10-Not Attempted due to Environmental Limitations-(lack of equipment, weather 

restraints, etc.).


88-Not Attempted due to Medical Conditions or Safety Concerns.


Oral Hygiene (QC):  6 (Independent )


Toilet Transfer (QC):  6 (Independent)





Other Treatment


Pt began tx using the restroom and once finished transferred self to recliner 

using FWW, independently. Pt sat in recliner to perform oral hygiene, IND. Pt 

complete 3lb free weights exercises at 10 reps each of the following, shoulder 

flexion, shoulder abduction, and elbow flexion/extension. Pt was unable to 

complete shoulder flexion on L side with 3lb weight, so pt used moderate 

resistance theraband to complete exercise routine. Pt stated that he was a 

little too sore from the prior day's session to complete shoulder flexion with 3

lb. Post tx, pt seated in recliner, call light in reach and all needs met.





Education


OT Patient Education:  Exercise program, Modified ADL techniques, Progress 

toward Goal/Update tx plan, Purpose of tx/functional activities, Reviewed 

precautions


Teaching Recipient:  Patient


Teaching Methods:  Discussion


Response to Teaching:  Verbalize Understanding





OT Short Term Goals


Short Term Goals


Time Frame:  2021


Eatin


Oral hygiene:  5


Toileting hygiene:  4


Shower/bathe self:  4


Upper body dressin


Lower body dressin


Putting on/taking off footwear:  4





OT Long Term Goals


Long Term Goals


Time Frame:  Feb 10, 2021


Eating (QC):  6


Oral Hygiene (QC):  6


Toileting Hygiene (QC):  6


Shower/Bathe Self (QC):  6


Upper Body Dressing (QC):  6


Lower Body Dressing (QC):  6


On/Off Footwear (QC):  6


Additional Goals:  1-Demonstrate ADL Tasks, 2-Verbalize Understanding, 

3-ImproveStrength/[place]


1=Demonstrate adherence to instructed precautions during ADL tasks.


2=Patient will verbalize/demonstrate understanding of assistive 

devices/modifications for ADL.


3=Patient will improve strength/tolerance for activity to enable patient to perf

orm ADL's.





OT Education/Plan


Problem List/Assessment


Assessment:  Decreased Activ Tolerance, Decreased UE Strength, Impaired Funct 

Balance, Restricted Funct UE ROM





Discharge Recommendations


Plan/Recommendations:  Continue POC





Treatment Plan/Plan of Care


Patient would benefit from OT for education, treatment and training to promote 

independence in ADL's, mobility, safety and/or upper extremity function for ADL

's.


Plan of Care:  ADL Retraining, Caregiver Training, Concurrent Therapy, 

Functional Mobility, Group Exercise/Act as Ind, UE Funct Exercise/Act, UE 

Neuromus Re-Ed/Coord


Treatment Duration:  2021


Frequency:  Modified Program (IRF)


Estimated Hrs Per Day:  1 hour per day


Agreement:  Yes


Rehab Potential:  Good





Time/GCodes


Start Time:  13:00


Stop Time:  13:30


Total Time Billed (hr/min):  30


Billed Treatment Time


1, ADL (10), EX (20')











GADIEL MAS OT          2021 14:19

## 2021-01-26 NOTE — PHYSICAL THERAPY DAILY NOTE
PT Daily Note-Current


Subjective


Patient in recliner pre tx, agrees to PT, has no complaints of pain.





Appearance


Patient in bed post tx with nurse call, phone, tray, all needs met.





Mental Status


Patient Orientation:  Normal For Age





Transfers


SCALE: Activities may be completed with or without assistive devices.





6-Indepedent-patient completes the activity by him/herself with no assistance 

from a helper.


5-Set-up or Clean-up Assistance-helper sets up or cleans up; patient completes 

activity. [place] assists only prior to or  


    following the activity.


4-Supervision or Touching Assistance-helper provides verbal cues and/or 

touching/steadying and/or contact guard assistance as patient completes 

activity. Assistance may be provided   


    throughout the activity or intermittently.


3-Partial/Moderate Assistance-helper does LESS THAN HALF the effort. [place] 

lifts, holds or supports trunk or limbs, but provides less than half the effort.


2-Substantial/Maximal Assistance-helper does MORE THAN HALF the effort. [place] 

lifts or holds trunk or limbs and provides more than half the effort.


8-Dmbkcbzbd-fmewmg does ALL the effort. Patient does none of the effort to 

complete the activity. Or, the assistance of 2 or more helpers is required for 

the patient to complete the  


    activity.


If activity was not attempted, code reason:


7-Patient Refused.


9-Not Applicable-not attempted and the patient did not perform the activity 

before the current illness, exacerbation or injury.


10-Not Attempted due to Environmental Limitations-(lack of equipment, weather 

restraints, etc.).


88-Not Attempted due to Medical Conditions or Safety Concerns.


Roll Left & Right (QC):  6


Sit to Stand (QC):  6


Chair/Bed-to-Chair Xfer(QC):  6


Car Transfer (QC):  6





Weight Bearing


Right Lower Extremity:  Right


Full Weight Bearing


Left Lower Extremity:  Left


Full Weight Bearing





Gait Training


Distance:  150', 120'


Walk 10 feet (QC):  6


Walk 50 ft with 2 Turns(QC):  6


Walk 150 ft (QC):  6


Walking 10ft/uneven surface-QC:  6


Gait Assistive Device:  FWW





Balance


Picking up an Object (QC):  6





Exercises


Standing:  Heel/toe raises, Mini squats


Standing Reps:  15


tandem standing with no arm support, hands hovering over parallel bars, each way

for 1 minute





Treatments


ambulation, transfers, LE exercise





Assessment


Current Status:  Fair Progress


improving endurance





PT Short Term Goals


Short Term Goals


Time Frame:  2021


Roll Left & Right:  6


Sit to lyin


Lying to sitting on side of be:  6


Sit to stand:  6


Chair/bed-to-chair transfer:  6





PT Long Term Goals


Long Term Goals


PT Long Term Goals Time Frame:  Feb 3, 2021


Roll Left & Right (QC):  6


Sit to Lying (QC):  6


Lying-Sitting on Side/Bed(QC):  6


Sit to Stand (QC):  6


Chair/Bed-to-Chair Xfer(QC):  6


Toilet Transfer (QC):  6


Car Transfer (QC):  5


Does the Patient Walk:  No and Walking Goal IS indicated


Walk 10 feet (QC):  6


Walk 50ft with 2 Turns (QC):  6


Walk 150 ft (QC):  6


Walking 10ft on Uneven Surface:  6


1 Step (curb) (QC):  6


4 Steps (QC):  5


12 Steps (QC):  5


Picking up an Object (QC):  6


Does the Pt use WC or Scooter?:  No


Wheel 50 feet with 2 turns (QC:  9


Type:  N/A


Wheel 150 feet:  9


Type:  N/A





PT Plan


Problem List


Problem List:  Activity Tolerance, Functional Strength, Safety, Balance, Gait, 

Transfer





Treatment/Plan


Treatment Plan:  Continue Plan of Care


Treatment Plan:  Bed Mobility, Education, Functional Activity [place], Functional 

Strength, Group Therapy, Gait, Safety, Therapeutic Exercise, Transfers


Treatment Duration:  Feb 3, 2021


Frequency:  Modified Program (IRF)


Estimated Hrs Per Day:  Other


Patient and/or Family Agrees t:  Yes





Safety Risks/Education


Patient Education:  Gait Training, Transfer Techniques, Correct Positioning, 

Safety Issues


Teaching Recipient:  Patient


Teaching Methods:  Demonstration, Discussion


Response to Teaching:  Reinforcement Needed





Time/GCodes


Time In:  1430


Time Out:  1500


Total Billed Treatment Time:  30


Total Billed Treatment


1 visit


GT 15'


EX 15'











LISA WOOD PT                2021 15:07

## 2021-01-26 NOTE — PHYSICAL THERAPY DAILY NOTE
PT Daily Note-Current


Subjective


Patient in recliner pre tx, agrees to PT, has no complaints of pain.





Appearance


Patient in bed post tx with nurse call, phone, tray, all needs met.





Mental Status


Patient Orientation:  Normal For Age


Attachments:  PEG Tube





Transfers


SCALE: Activities may be completed with or without assistive devices.





6-Indepedent-patient completes the activity by him/herself with no assistance 

from a helper.


5-Set-up or Clean-up Assistance-helper sets up or cleans up; patient completes 

activity. [place] assists only prior to or  


    following the activity.


4-Supervision or Touching Assistance-helper provides verbal cues and/or 

touching/steadying and/or contact guard assistance as patient completes 

activity. Assistance may be provided   


    throughout the activity or intermittently.


3-Partial/Moderate Assistance-helper does LESS THAN HALF the effort. [place] 

lifts, holds or supports trunk or limbs, but provides less than half the effort.


2-Substantial/Maximal Assistance-helper does MORE THAN HALF the effort. [place] 

lifts or holds trunk or limbs and provides more than half the effort.


7-Groixrnjm-kkgcau does ALL the effort. Patient does none of the effort to 

complete the activity. Or, the assistance of 2 or more helpers is required for 

the patient to complete the  


    activity.


If activity was not attempted, code reason:


7-Patient Refused.


9-Not Applicable-not attempted and the patient did not perform the activity 

before the current illness, exacerbation or injury.


10-Not Attempted due to Environmental Limitations-(lack of equipment, weather 

restraints, etc.).


88-Not Attempted due to Medical Conditions or Safety Concerns.


Roll Left & Right (QC):  6


Sit to Lying (QC):  6


Lying to Sitting/Side of Bed(Q:  6


Sit to Stand (QC):  6


Chair/Bed-to-Chair Xfer(QC):  6


Toilet Transfer (QC):  6





Weight Bearing


Right Lower Extremity:  Right


Full Weight Bearing


Left Lower Extremity:  Left


Full Weight Bearing





Gait Training


Distance:  120'x4


Walk 10 feet (QC):  6


Walk 50 ft with 2 Turns(QC):  6


Gait Assistive Device:  FWW


independent, narrow ROBERT but no unsteadiness or LOB





Exercises


NuStep Minutes:  15


 NuStep Workload:  5





Treatments


bed mobility and transfers, ambulation, LE strengthening





Assessment


Current Status:  Fair Progress


improving functional mobility but patient still gets very SOB with activity, 

needs frequent rest breaks





PT Short Term Goals


Short Term Goals


Time Frame:  2021


Roll Left & Right:  6


Sit to lyin


Lying to sitting on side of be:  6


Sit to stand:  6


Chair/bed-to-chair transfer:  6





PT Long Term Goals


Long Term Goals


PT Long Term Goals Time Frame:  Feb 3, 2021


Roll Left & Right (QC):  6


Sit to Lying (QC):  6


Lying-Sitting on Side/Bed(QC):  6


Sit to Stand (QC):  6


Chair/Bed-to-Chair Xfer(QC):  6


Toilet Transfer (QC):  6


Car Transfer (QC):  5


Does the Patient Walk:  No and Walking Goal IS indicated


Walk 10 feet (QC):  6


Walk 50ft with 2 Turns (QC):  6


Walk 150 ft (QC):  6


Walking 10ft on Uneven Surface:  6


1 Step (curb) (QC):  6


4 Steps (QC):  5


12 Steps (QC):  5


Picking up an Object (QC):  6


Does the Pt use WC or Scooter?:  No


Wheel 50 feet with 2 turns (QC:  9


Type:  N/A


Wheel 150 feet:  9


Type:  N/A





PT Plan


Problem List


Problem List:  Activity Tolerance, Functional Strength, Safety, Balance, Gait, 

Transfer





Treatment/Plan


Treatment Plan:  Continue Plan of Care


Treatment Plan:  Bed Mobility, Education, Functional Activity [place], Functional 

Strength, Group Therapy, Gait, Safety, Therapeutic Exercise, Transfers


Treatment Duration:  Feb 3, 2021


Frequency:  Modified Program (IRF)


Estimated Hrs Per Day:  Other


Patient and/or Family Agrees t:  Yes





Safety Risks/Education


Patient Education:  Gait Training, Transfer Techniques, Correct Positioning, 

Safety Issues


Teaching Recipient:  Patient


Teaching Methods:  Demonstration, Discussion


Response to Teaching:  Reinforcement Needed





Time/GCodes


Time In:  0900


Time Out:  1000


Total Billed Treatment Time:  60


Total Billed Treatment


1 visit


EX 15'


GT 45'











LISA WOOD PT                2021 09:55

## 2021-01-26 NOTE — PM&R PROGRESS NOTE
Subjective


HPI/CC On Admission


Date Seen by Provider:  2021


Time Seen by Provider:  11:50


Subjective/Events-last exam


21:


Pt requiring 1 liter of O2


Bowels moved today


Completing steroid and antibiotics 


Dr. Gore reviewed his imaging scans








21:


Pt still having a lot of SOB


Requiring oxygen


CT scan shows Covid lungs


Consulting Dr. Gore


ABG ordered


Steroids will continue 








21:


Felt like he has a "cold"


Noted O2 requirements now 1L/min


CXR revealed COVID lungs


Difficult to say whether he has bacterial PNA superimposed


Labs checked


Increase steroids


Added bronchitis abx








21:


Patient is doing well


Ambulating well


Talked about COVID a lot today and his hospital course prior to coming here








21:


Much improved status


Monitor O2


Monitor pain


Walking well








21:


Bowels moved today


Urinating pretty well


Denies any significant new concerns


Gaining strength








21:


Bowels are moving 


Doing very well 


Dramatic improvement walking 


Overall no significant issues








21:


Pt doing pretty well


Slept well


Denies any significant new issues


Participating in therapy








21:


Pt slept really well 


Doing well 


No pain 


Getting stronger 


Monitoring closely








21:


Did not sleep well so willing to try Remeron 15mg and Melatonin


Motivated


Scoliosis noted


Ankle weights provided to help strengthening








21:


Pastoral care visited him and that was helpful given 3 immediate family members 

have  in the past 1 month


Trach and PEG dressing changes


[place] on heels


Gaining strength








1/15/21:


Pastoral care notified


Family members 3 of them have  within 2 months


Tremors noted


Heels sore


PEG  placed








21:


Wants PEG tube removed


History of placement 20


No issues


Up in chair today


Tachycardia noted and on BB


NO pain





Review of Systems


Pulmonary:  Dyspnea, Cough





Focused Exam


Lactate Level


21 15:47: Lactic Acid Level 2.00








Objective


Exam


Vital Signs





Vital Signs








  Date Time  Temp Pulse Resp B/P (MAP) Pulse Ox O2 Delivery O2 Flow Rate FiO2


 


21 20:30      Room Air  


 


21 18:48     83   


 


21 18:42 36.4 97 16 139/76 (97)    


 


21 06:00       1.00 





Capillary Refill :


General Appearance:  No Apparent Distress, WD/WN, Anxious, Thin


HEENT:  PERRL/EOMI, Normal ENT Inspection, Pharynx Normal


Neck:  Full Range of Motion, Normal Inspection, Non Tender, Supple, Carotid 

Bruit


Respiratory:  Chest Non Tender, Lungs Clear, No Accessory Muscle Use, No 

Respiratory Distress, Decreased Breath Sounds


Cardiovascular:  Regular Rate, Rhythm, No Edema, No Gallop, No JVD, No Murmur, 

Normal Peripheral Pulses


Gastrointestinal:  Normal Bowel Sounds, No Organomegaly, No Pulsatile Mass, Non 

Tender, Soft


Back:  Normal Inspection, No CVA Tenderness, No Vertebral Tenderness


Extremity:  Normal Capillary Refill, Normal Inspection, Normal Range of Motion, 

Non Tender, No Calf Tenderness, No Pedal Edema


Neurologic/Psychiatric:  Alert, Oriented x3, No Motor/Sensory Deficits, Abnormal

Gait, Depressed Affect, Motor Weakness (generalized all extremities)


Skin:  Normal Color, Warm/Dry


Lymphatic:  No Adenopathy





Results/Procedures


Lab


Patient resulted labs reviewed.





FIM


Transfers


Therapy Code Descriptions/Definitions 





Functional [place] Measure:


0=Not Assessed/NA        4=Minimal Assistance


1=Total Assistance        5=Supervision or Setup


2=Maximal Assistance  6=Modified [place]


3=Moderate Assistance 7=Complete IndependenceSCALE: Activities may be completed 

with or without assistive devices.





6-Indepedent-patient completes the activity by him/herself with no assistance 

from a helper.


5-Set-up or Clean-up Assistance-helper sets up or cleans up; patient completes 

activity. [place] assists only prior to or  


    following the activity.


4-Supervision or Touching Assistance-helper provides verbal cues and/or 

touching/steadying and/or contact guard assistance as patient completes 

activity. Assistance may be provided   


    throughout the activity or intermittently.


3-Partial/Moderate Assistance-helper does LESS THAN HALF the effort. [place] 

lifts, holds or supports trunk or limbs, but provides less than half the effort.


2-Substantial/Maximal Assistance-helper does MORE THAN HALF the effort. [place] 

lifts or holds trunk or limbs and provides more than half the effort.


4-Nmshjhaky-bnzhsd does ALL the effort. Patient does none of the effort to 

complete the activity. Or, the assistance of 2 or more helpers is required for 

the patient to complete the  


    activity.


If activity was not attempted, code reason:


7-Patient Refused.


9-Not Applicable-not attempted and the patient did not perform the activity 

before the current illness, exacerbation or injury.


10-Not Attempted due to Environmental Limitations-(lack of equipment, weather 

restraints, etc.).


88-Not Attempted due to Medical Conditions or Safety Concerns.


Roll Left to Right (QC):  6


Sit to Lying (QC):  5


Sit to Stand (QC):  6


Chair/Bed-to-Chair Xfer(QC):  6


Car Transfer (QC):  88





Gait Training


Does the Patient Walk?:  Yes


Distance:  120'x2


Walk 10 feet (QC):  6


Walk 50 ft with 2 Turns(QC):  6


Walk 150 ft (QC):  3 (CGA for safety)


Walking 10ft/uneven surface-QC:  88


Gait Persons Needed:  1


Gait Assistive Device:  FWW





Wheelchair Training


Does the Pt Use a Wheelchair?:  Yes


Wheel 50 ft with 2 turns (QC):  5


Wheel 150 ft (QC):  5


Type of Wheelchair:  Manual





Stair Training


1 Step (curb) (QC):  88


4 Steps (QC):  88


12 Steps (QC):  88





Balance


Picking up an Object (QC):  88





ADL-Treatment


Eating (QC):  6


Oral Hygiene (QC):  6 (completes with IND with items placed in front of pt.)


Bathing Location:  L Arm, R Arm, L Upper Leg, R Upper Leg, L Lower Leg 

(including foot), R Lower Leg (including foot), Chest, Abdomen, Buttocks, 

Perineal Area


Shower/Bathe Self (QC):  4 (SUP during all tasks. Pt completes EOB, breaks for 

breath. )


Upper Body Dressing (QC):  5 (s/u)


Lower Body Dressing (QC):  4 (SUP)


On/Off Footwear (QC):  6 (IND EOB)


Toileting Hygiene (QC):  4 (SUP in stance for bottom care. )


Toilet Transfer (QC):  7 (denies needs for toileting.)





Assessment/Plan


Assessment and Plan


Assess & Plan/Chief Complaint


Assessment:


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


Anemia


s/p trach now DC


Steroids tapered


Scoliosis





Plan:


IRF protocol


Increase ADL independence


Ambulate


O2 monitoring





21:


PEG tube when able to DC will DC


Monitor BP and HR


Monitor O2





1/15/21:


Supportive care


Monitor closely





21:


Monitor closely


BP stable


Pastoral care visit





21:


Insomnia treatment


Monitor closely


Pain management





21:


Monitor insomnia


Therapy


Wean O2





21:


Monitor O2 during exertion


Insomnia treatment


Remeron also helps depression





21:


Monitor insomnia


Continue treatment


Declines pain meds for scoliosis





21:


Improved ambulation


Improved dyspnea


Overall dramatic improvement





21:


Monitor O2


Increase ambulation





21:


Ambulate


O2





21:


Increase steroid dose


Abx


Monitor O2


Labs





21:


CT chest


Abx


Steroids


Consult Dr Gore





21:


URI treatment


DC tomorrow


Home O2 eval





(1) Myopathy


(2) History of tracheostomy


(3) Frailty


(4) Cough


(5) Anemia


(6) COVID-19











THOR MATHEWS DO                2021 08:32

## 2021-01-26 NOTE — NUR
CM/SS DISCHARGE PLANNING

Patient has requested to discharge home tomorrow.  Met with patient and PT/OT therapists 
this a.m. then discussed with physician.  All were in agreement that patient is safe to 
discharge home with his significant other.



OUTPATIENT THERAPY:  Patient has requested outpatient therapies at [facility], in his hometown of [place].  Referral initiated, will finalize 
tomorrow once orders are received.  Patient understands that therapy center will contact him 
directly to set up his schedule.



DME:  Patient has FWW.  OT recommended a shower chair, patient understands this is not an 
insurance covered item.  He indicates he can afford to purchase one if necessary.



Patient offered  time of 12 noon tomorrow, Unit RN aware.

## 2021-01-26 NOTE — PULMONARY PROGRESS NOTE
Subjective


Time Seen by a Provider:  07:01


Subjective/Events-last exam


No complications noted.





Sepsis Event


Evaluation


Height, Weight, BMI


Height: '"


Weight: lbs. oz. kg; 26.00 BMI


Method:





Focused Exam


Lactate Level


1/24/21 15:47: Lactic Acid Level 2.00





Exam


Exam





Vital Signs








  Date Time  Temp Pulse Resp B/P (MAP) Pulse Ox O2 Delivery O2 Flow Rate FiO2


 


1/26/21 06:00 36.6 83 20 100/65 (77) 99 Nasal Cannula 1.00 


 


1/25/21 21:23     86 Room Air  


 


1/25/21 20:00     92 Room Air  


 


1/25/21 18:40     95 Nasal Cannula 1.00 


 


1/25/21 18:29 36.3 92 20 137/81 (99) 93 Room Air  


 


1/25/21 10:21     95 Room Air  


 


1/25/21 08:36      Room Air  














I & O 


 


 1/26/21





 07:00


 


Intake Total 1550 ml


 


Output Total 1450 ml


 


Balance 100 ml








Height & Weight


Height: '"


Weight: lbs. oz. kg; 26.00 BMI


Method:


General Appearance:  No Apparent Distress, WD/WN, Anxious, Thin


HEENT:  PERRL/EOMI, Normal ENT Inspection, Pharynx Normal


Neck:  Full Range of Motion, Normal Inspection, Non Tender, Supple, Carotid 

Bruit


Respiratory:  Chest Non Tender, Lungs Clear, No Accessory Muscle Use, No 

Respiratory Distress, Decreased Breath Sounds


Cardiovascular:  Regular Rate, Rhythm, No Edema, No Gallop, No JVD, No Murmur, 

Normal Peripheral Pulses


Extremity:  Normal Capillary Refill, Normal Inspection, Normal Range of Motion, 

Non Tender, No Calf Tenderness, No Pedal Edema


Neurologic/Psychiatric:  Alert, Oriented x3, No Motor/Sensory Deficits, Abnormal

Gait, Depressed Affect, Motor Weakness (generalized all extremities)


Skin:  Normal Color, Warm/Dry


Lymphatic:  No Adenopathy





Results


Lab


Laboratory Tests


1/24/21 15:47








1/25/21 09:40











Assessment/Plan


Assessment/Plan


recovering COVID


   -CT of chest reviewed - No PE does show extensive bilateral infiltrates 


      -Repeat CT of chest as out pt in about 3 mo. 


   -continue prednisone 


   -Oxygen 


   -Labs and CXR reviwed 


PNA 


   -Continue Cefepime 


Debility











CHRISTI MAS DO              Jan 26, 2021 07:03

## 2021-01-27 VITALS — DIASTOLIC BLOOD PRESSURE: 65 MMHG | SYSTOLIC BLOOD PRESSURE: 113 MMHG

## 2021-01-27 VITALS — DIASTOLIC BLOOD PRESSURE: 87 MMHG | SYSTOLIC BLOOD PRESSURE: 137 MMHG

## 2021-01-27 VITALS — SYSTOLIC BLOOD PRESSURE: 137 MMHG | DIASTOLIC BLOOD PRESSURE: 87 MMHG

## 2021-01-27 RX ADMIN — FAMOTIDINE SCH MG: 20 TABLET, FILM COATED ORAL at 08:36

## 2021-01-27 RX ADMIN — METOPROLOL TARTRATE SCH MG: 50 TABLET, FILM COATED ORAL at 08:37

## 2021-01-27 RX ADMIN — DOCUSATE SODIUM AND SENNOSIDES SCH EA: 8.6; 5 TABLET, FILM COATED ORAL at 08:37

## 2021-01-27 RX ADMIN — ENOXAPARIN SODIUM SCH MG: 100 INJECTION SUBCUTANEOUS at 08:37

## 2021-01-27 RX ADMIN — DOCUSATE SODIUM SCH MG: 100 CAPSULE ORAL at 08:36

## 2021-01-27 RX ADMIN — POLYETHYLENE GLYCOL (3350) SCH GM: 17 POWDER, FOR SOLUTION ORAL at 08:36

## 2021-01-27 RX ADMIN — CEFDINIR SCH MG: 300 CAPSULE ORAL at 08:36

## 2021-01-27 NOTE — THERAPY TEAM DISCHARGE SUMMARY
Therapy Discharge Summary


Discharge Recommendations


Date of Discharge


Jan 27, 2021 at 12:00





Physical Therapy


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  Upon 

evaluation patient performed bed mobility with independence, supine <-> sit 

min/mod assist, sit to stand max assist.  Patient has been performing bed 

mobility and transfer training, balance and endurance training, functional 

strengthening, stair training, gait training, and education.  Patient has made 

good progress and has met all of his long term goals except for stairs.  Now, 

patient is independent with bed mobility and transfers, independent with car 

transfer, ambulates 150' with a rolling walker with independence (including 50' 

with at least 2 turns of 90 degrees and 10' over an uneven surface), can 

and object from the floor with independence.  Patient was discharged from this 

facility and will be discharged from PT at this time.





Occupational Therapy


Decreased Activ Tolerance, Decreased UE Strength, Impaired Funct Balance, 

Restricted Funct UE ROM





PT Long Term Goals


Long Term Goals


PT Long Term Goals Time Frame:  Feb 3, 2021


Roll Left to Right (QC):  6


Sit to Lying (QC):  6


Lying-Sitting on Side/Bed(QC):  6


Sit to Stand (QC):  6


Chair/Bed-to-Chair Xfer(QC):  6


Car Transfer (QC):  5


Does the Patient Walk:  No and Walking Goal IS indicated


Walk 10 feet (QC):  6


Walk 10ft-Uneven Surface(QC):  6


Walk 50ft with 2 Turns (QC):  6


Walk 150 ft (QC):  6


Does the Pt use WC or Scooter?:  No


Wheel 50 feet with 2 turns (QC:  9


1 Step (curb) (QC):  6


4 Steps (QC):  5


12 Steps (QC):  5


Picking up an Object (QC):  6





OT Long Term Goals


Long Term Goals


Time Frame:  Feb 10, 2021


Eating (FIM):  6


Eating (QC):  6


Oral Hygiene (QC):  6


Shower/Bathe Self (QC):  6


Upper Body Dressing (QC):  6


Lower Body Dressing (QC):  6


On/Off Footwear (QC):  6


Toileting(FIM):  6


Toileting Hygiene (QC):  6


Toilet/Commode Transfer (QC):  6


Additional Goals:  1-Demonstrate ADL Tasks, 2-Verbalize Understanding, 3-

ImproveStrength/[place]


1=Demonstrate adherence to instructed precautions during ADL tasks.


2=Patient will verbalize/demonstrate understanding of assistive 

devices/modifications for ADL.


3=Patient will improve strength/tolerance for activity to enable patient to 

perform ADL's.











LISA WOOD PT                Jan 27, 2021 15:48

## 2021-01-27 NOTE — PULMONARY PROGRESS NOTE
Subjective


Time Seen by a Provider:  08:11


Subjective/Events-last exam


No complications noted.





Sepsis Event


Evaluation


Height, Weight, BMI


Height: '"


Weight: lbs. oz. kg; 26.00 BMI


Method:





Focused Exam


Lactate Level


1/24/21 15:47: Lactic Acid Level 2.00





Exam


Exam





Vital Signs








  Date Time  Temp Pulse Resp B/P (MAP) Pulse Ox O2 Delivery O2 Flow Rate FiO2


 


1/27/21 05:59 36.8 90 18 113/65 (81) 91 Room Air  


 


1/26/21 20:30      Room Air  


 


1/26/21 18:48     83 Room Air  


 


1/26/21 18:42 36.4 97 16 139/76 (97) 94 Room Air  


 


1/26/21 09:00      Room Air  














I & O 


 


 1/27/21





 07:00


 


Intake Total 1100 ml


 


Output Total 1230 ml


 


Balance -130 ml








Height & Weight


Height: '"


Weight: lbs. oz. kg; 26.00 BMI


Method:


General Appearance:  No Apparent Distress, WD/WN, Anxious, Thin


HEENT:  PERRL/EOMI, Normal ENT Inspection, Pharynx Normal


Neck:  Full Range of Motion, Normal Inspection, Non Tender, Supple, Carotid 

Bruit


Respiratory:  Chest Non Tender, Lungs Clear, No Accessory Muscle Use, No 

Respiratory Distress, Decreased Breath Sounds


Cardiovascular:  Regular Rate, Rhythm, No Edema, No Gallop, No JVD, No Murmur, 

Normal Peripheral Pulses


Extremity:  Normal Capillary Refill, Normal Inspection, Normal Range of Motion, 

Non Tender, No Calf Tenderness, No Pedal Edema


Neurologic/Psychiatric:  Alert, Oriented x3, No Motor/Sensory Deficits, Abnormal

Gait, Depressed Affect, Motor Weakness (generalized all extremities)


Skin:  Normal Color, Warm/Dry


Lymphatic:  No Adenopathy





Results


Lab


Laboratory Tests


1/25/21 09:40











Assessment/Plan


Assessment/Plan


recovering COVID


   -CT of chest reviewed - No PE does show extensive bilateral infiltrates 


      -Repeat CT of chest as out pt in about 3 mo. 


   - prednisone 


   -Oxygen 


   -Labs and CXR reviwed 


PNA 


   -Cefepime 


Debility











CHRISTI MAS DO              Jan 27, 2021 08:12

## 2021-01-27 NOTE — NUR
CM/SS DISCHARGE

Visited with patient this a.m., he is dressed and ready to leave, very excited since he has 
been hospitalized continuously since 11/23/20.



OUTPATIENT THERAPY finalized with Marn[facility].



Patient's Rx was transmitted to [facility] and prior authorization process was 
required for his Advair.  Discussed with pharmacy staff and they forwarded to Dr. José Antonio Gore, Pulmonologist, for processing.  



Patient confirmed he had all recommended home assistive devices and that he was going to 
either use a chair from his home or purchase a shower chair for his bathing needs.



Writer provided short term disability information to his employer insurer at patient's 
request and with proper signed authorization to release.  Patient has also spoken with 
appropriate persons both at his employment and his designated representative at [facility].  Writer mailed originals to patient's home address with email confirming they had 
received the information.  



Unit RN was aware of all timelines during the discharge process this a.m.

## 2021-01-27 NOTE — DISCHARGE SUMMARY
Diagnosis/Chief Complaint


Date of Admission


Jan 13, 2021 at 10:19


Date of Discharge





Discharge Date:  Jan 27, 2021


Discharge Diagnosis


Assessment:


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


Anemia


s/p trach now DC


Steroids tapered


Scoliosis





Plan:


IRF protocol


Increase ADL independence


Ambulate


O2 monitoring





1/14/21:


PEG tube when able to DC will DC


Monitor BP and HR


Monitor O2





1/15/21:


Supportive care


Monitor closely





1/16/21:


Monitor closely


BP stable


Pastoral care visit





1/17/21:


Insomnia treatment


Monitor closely


Pain management





1/18/21:


Monitor insomnia


Therapy


Wean O2





1/19/21:


Monitor O2 during exertion


Insomnia treatment


Remeron also helps depression





1/20/21:


Monitor insomnia


Continue treatment


Declines pain meds for scoliosis





1/21/21:


Improved ambulation


Improved dyspnea


Overall dramatic improvement





1/22/21:


Monitor O2


Increase ambulation





1/23/21:


Ambulate


O2





1/24/21:


Increase steroid dose


Abx


Monitor O2


Labs





1/25/21:


CT chest


Abx


Steroids


Consult Dr Gore





1/26/21:


URI treatment


DC tomorrow


Home O2 eval





(1) Myopathy


(2) History of tracheostomy


(3) Frailty


(4) Cough


(5) Anemia


(6) COVID-19





Discharge Summary


Discharge Physical Examination


Allergies:  


Coded Allergies:  


     No Known Drug Allergies (Unverified , 1/13/21)


Vitals & I&Os





Vital Signs








  Date Time  Temp Pulse Resp B/P (MAP) Pulse Ox O2 Delivery O2 Flow Rate FiO2


 


1/27/21 13:16 36.6 99 18 137/87 90 Room Air  


 


1/26/21 06:00       1.00 








General Appearance:  Alert, Oriented X3, Cooperative


Respiratory:  Clear to Auscultation


Cardiovascular:  Regular Rate


Neuro:  Normal Gait, Normal Speech, Strength at 5/5 X4 Ext


Psych/Mental Status:  Mental Status NL





Hospital Course


Was the Problem List Reviewed?:  Yes


Hospital course: 


Pt had an uneventful hospital course for two weeks while in rehab due to 

critical illness myopathy due to Covid-19, overall he did very well, had an 

episode of bronchitis and exacerbation of lung disease of which resolved with 

Omnicef and Prednisone. Overall was doing very well, baseline with scoliosis was

limited a bit but he was back to his baseline, was much improved, did not 

require home oxygen evaluation since he does have oxygen at home and overall he 

was ready for discharge and deemed stable for discharge. PEG tube can be removed

mid March.


Labs (last 24 hrs)


Laboratory Tests


1/14/21 05:45: 


White Blood Count 9.2, Red Blood Count 3.45L, Hemoglobin 10.3L, Hematocrit 33L, 

Mean Corpuscular Volume 95, Mean Corpuscular Hemoglobin 30, Mean Corpuscular 

Hemoglobin Concent 31L, Red Cell Distribution Width 16.9H, Platelet Count 198, 

Mean Platelet Volume 10.0, Immature Granulocyte % (Auto) 1, Neutrophils (%) 

(Auto) 59, Lymphocytes (%) (Auto) 26, Monocytes (%) (Auto) 9, Eosinophils (%) 

(Auto) 5, Basophils (%) (Auto) 1, Neutrophils # (Auto) 5.4, Lymphocytes # (Auto)

2.4, Monocytes # (Auto) 0.8, Eosinophils # (Auto) 0.5H, Basophils # (Auto) 0.1, 

Immature Granulocyte # (Auto) 0.1, Sodium Level 138, Potassium Level 3.7, 

Chloride Level 103, Carbon Dioxide Level 25, Anion Gap 10, Blood Urea Nitrogen 

13, Creatinine 1.00, Estimat Glomerular Filtration Rate > 60, BUN/Creatinine 

Ratio 13, Glucose Level 92, Calcium Level 8.5, Corrected Calcium 9.1, Total 

Bilirubin 0.4, Aspartate Amino Transf (AST/SGOT) 25, Alanine Aminotransferase 

(ALT/SGPT) 79H, Alkaline Phosphatase 97, Total Protein 6.1L, Albumin 3.2


1/18/21 06:00: 


White Blood Count 8.6, Red Blood Count 3.54L, Hemoglobin 10.6L, Hematocrit 34L, 

Mean Corpuscular Volume 96, Mean Corpuscular Hemoglobin 30, Mean Corpuscular 

Hemoglobin Concent 31L, Red Cell Distribution Width 16.5H, Platelet Count 190, 

Mean Platelet Volume 9.9, Immature Granulocyte % (Auto) 1, Neutrophils (%) 

(Auto) 55, Lymphocytes (%) (Auto) 32, Monocytes (%) (Auto) 8, Eosinophils (%) 

(Auto) 4, Basophils (%) (Auto) 1, Neutrophils # (Auto) 4.7, Lymphocytes # (Auto)

2.7, Monocytes # (Auto) 0.7, Eosinophils # (Auto) 0.3, Basophils # (Auto) 0.1, 

Immature Granulocyte # (Auto) 0.1, Sodium Level 141, Potassium Level 3.8, 

Chloride Level 105, Carbon Dioxide Level 28, Anion Gap 8, Blood Urea Nitrogen 9,

Creatinine 0.99, Estimat Glomerular Filtration Rate > 60, BUN/Creatinine Ratio 

9, Glucose Level 88, Calcium Level 8.6, Corrected Calcium 9.2, Total Bilirubin 0

.4, Aspartate Amino Transf (AST/SGOT) 26, Alanine Aminotransferase (ALT/SGPT) 

73H, Alkaline Phosphatase 82, Total Protein 6.1L, Albumin 3.3


1/24/21 15:47: 


White Blood Count 10.4, Red Blood Count 3.82L, Hemoglobin 11.6L, Hematocrit 37L,

Mean Corpuscular Volume 96, Mean Corpuscular Hemoglobin 30, Mean Corpuscular 

Hemoglobin Concent 32, Red Cell Distribution Width 15.6H, Platelet Count 214, 

Mean Platelet Volume 10.3, Immature Granulocyte % (Auto) 0, Neutrophils (%) 

(Auto) 76H, Lymphocytes (%) (Auto) 17, Monocytes (%) (Auto) 3, Eosinophils (%) 

(Auto) 3, Basophils (%) (Auto) 1, Neutrophils # (Auto) 8.0H, Lymphocytes # 

(Auto) 1.8, Monocytes # (Auto) 0.3, Eosinophils # (Auto) 0.3, Basophils # (Auto)

0.1, Immature Granulocyte # (Auto) 0.0, Sodium Level 138, Potassium Level 4.2, 

Chloride Level 103, Carbon Dioxide Level 22, Anion Gap 13, Blood Urea Nitrogen 

8, Creatinine 1.05, Estimat Glomerular Filtration Rate > 60, BUN/Creatinine 

Ratio 8, Glucose Level 101, Calcium Level 8.7, Corrected Calcium 9.1, Total 

Bilirubin 0.3, Aspartate Amino Transf (AST/SGOT) 17, Alanine Aminotransferase 

(ALT/SGPT) 39, Alkaline Phosphatase 89, Total Protein 6.7, Albumin 3.5, Lactic 

Acid Level 2.00, Procalcitonin 0.06


1/25/21 09:40: 


White Blood Count 12.2H, Red Blood Count 4.11L, Hemoglobin 12.3L, Hematocrit 39L

, Mean Corpuscular Volume 95, Mean Corpuscular Hemoglobin 30, Mean Corpuscular 

Hemoglobin Concent 32, Red Cell Distribution Width 15.4H, Platelet Count 252, 

Mean Platelet Volume 10.4, Immature Granulocyte % (Auto) 0, Neutrophils (%) 

(Auto) 85H, Lymphocytes (%) (Auto) 12, Monocytes (%) (Auto) 3, Eosinophils (%) 

(Auto) 0, Basophils (%) (Auto) 0, Neutrophils # (Auto) 10.3H, Lymphocytes # 

(Auto) 1.5, Monocytes # (Auto) 0.3, Eosinophils # (Auto) 0.0, Basophils # (Auto)

0.0, Immature Granulocyte # (Auto) 0.0, Sodium Level 139, Potassium Level 4.2, 

Chloride Level 102, Carbon Dioxide Level 20L, Anion Gap 17H, Blood Urea Nitrogen

10, Creatinine 1.01, Estimat Glomerular Filtration Rate > 60, BUN/Creatinine 

Ratio 10, Glucose Level 117H, Calcium Level 9.5, Corrected Calcium 9.6, Total 

Bilirubin 0.4, Aspartate Amino Transf (AST/SGOT) 29, Alanine Aminotransferase 

(ALT/SGPT) 46, Alkaline Phosphatase 93, Total Protein 7.6, Albumin 3.9


1/25/21 12:13: 


Blood Gas Puncture Site LR, Blood Gas Patient Temperature 36.4, Arterial Blood 

pH 7.42, Arterial Blood Partial Pressure CO2 36, Arterial Blood Partial Pressure

O2 64L, Arterial Blood HCO3 23, Arterial Blood Total CO2 23.9, Arterial Blood 

Oxygen Saturation 93L, Arterial Blood Base Excess -1.3, Brett Test YES-POS, 

Blood Gas Ventilator Setting NO, Blood Gas Inspired Oxygen RA





Pending Labs


Laboratory Tests


1/14/21 05:45: 


White Blood Count 9.2, Red Blood Count 3.45, Hemoglobin 10.3, Hematocrit 33, 

Mean Corpuscular Volume 95, Mean Corpuscular Hemoglobin 30, Mean Corpuscular 

Hemoglobin Concent 31, Red Cell Distribution Width 16.9, Platelet Count 198, 

Mean Platelet Volume 10.0, Immature Granulocyte % (Auto) 1, Neutrophils (%) 

(Auto) 59, Lymphocytes (%) (Auto) 26, Monocytes (%) (Auto) 9, Eosinophils (%) 

(Auto) 5, Basophils (%) (Auto) 1, Neutrophils # (Auto) 5.4, Lymphocytes # (Auto)

2.4, Monocytes # (Auto) 0.8, Eosinophils # (Auto) 0.5, Basophils # (Auto) 0.1, 

Immature Granulocyte # (Auto) 0.1, Sodium Level 138, Potassium Level 3.7, 

Chloride Level 103, Carbon Dioxide Level 25, Anion Gap 10, Blood Urea Nitrogen 

13, Creatinine 1.00, Estimat Glomerular Filtration Rate > 60, BUN/Creatinine 

Ratio 13, Glucose Level 92, Calcium Level 8.5, Corrected Calcium 9.1, Total 

Bilirubin 0.4, Aspartate Amino Transf (AST/SGOT) 25, Alanine Aminotransferase 

(ALT/SGPT) 79, Alkaline Phosphatase 97, Total Protein 6.1, Albumin 3.2


1/18/21 06:00: 


White Blood Count 8.6, Red Blood Count 3.54, Hemoglobin 10.6, Hematocrit 34, 

Mean Corpuscular Volume 96, Mean Corpuscular Hemoglobin 30, Mean Corpuscular 

Hemoglobin Concent 31, Red Cell Distribution Width 16.5, Platelet Count 190, 

Mean Platelet Volume 9.9, Immature Granulocyte % (Auto) 1, Neutrophils (%) 

(Auto) 55, Lymphocytes (%) (Auto) 32, Monocytes (%) (Auto) 8, Eosinophils (%) 

(Auto) 4, Basophils (%) (Auto) 1, Neutrophils # (Auto) 4.7, Lymphocytes # (Auto)

2.7, Monocytes # (Auto) 0.7, Eosinophils # (Auto) 0.3, Basophils # (Auto) 0.1, 

Immature Granulocyte # (Auto) 0.1, Sodium Level 141, Potassium Level 3.8, 

Chloride Level 105, Carbon Dioxide Level 28, Anion Gap 8, Blood Urea Nitrogen 9,

Creatinine 0.99, Estimat Glomerular Filtration Rate > 60, BUN/Creatinine Ratio 

9, Glucose Level 88, Calcium Level 8.6, Corrected Calcium 9.2, Total Bilirubin 

0.4, Aspartate Amino Transf (AST/SGOT) 26, Alanine Aminotransferase (ALT/SGPT) 

73, Alkaline Phosphatase 82, Total Protein 6.1, Albumin 3.3


1/24/21 15:47: 


White Blood Count 10.4, Red Blood Count 3.82, Hemoglobin 11.6, Hematocrit 37, 

Mean Corpuscular Volume 96, Mean Corpuscular Hemoglobin 30, Mean Corpuscular 

Hemoglobin Concent 32, Red Cell Distribution Width 15.6, Platelet Count 214, 

Mean Platelet Volume 10.3, Immature Granulocyte % (Auto) 0, Neutrophils (%) 

(Auto) 76, Lymphocytes (%) (Auto) 17, Monocytes (%) (Auto) 3, Eosinophils (%) 

(Auto) 3, Basophils (%) (Auto) 1, Neutrophils # (Auto) 8.0, Lymphocytes # (Auto)

1.8, Monocytes # (Auto) 0.3, Eosinophils # (Auto) 0.3, Basophils # (Auto) 0.1, 

Immature Granulocyte # (Auto) 0.0, Sodium Level 138, Potassium Level 4.2, 

Chloride Level 103, Carbon Dioxide Level 22, Anion Gap 13, Blood Urea Nitrogen 

8, Creatinine 1.05, Estimat Glomerular Filtration Rate > 60, BUN/Creatinine 

Ratio 8, Glucose Level 101, Calcium Level 8.7, Corrected Calcium 9.1, Total 

Bilirubin 0.3, Aspartate Amino Transf (AST/SGOT) 17, Alanine Aminotransferase 

(ALT/SGPT) 39, Alkaline Phosphatase 89, Total Protein 6.7, Albumin 3.5, Lactic 

Acid Level 2.00, Procalcitonin 0.06


1/25/21 09:40: 


White Blood Count 12.2, Red Blood Count 4.11, Hemoglobin 12.3, Hematocrit 39, 

Mean Corpuscular Volume 95, Mean Corpuscular Hemoglobin 30, Mean Corpuscular 

Hemoglobin Concent 32, Red Cell Distribution Width 15.4, Platelet Count 252, 

Mean Platelet Volume 10.4, Immature Granulocyte % (Auto) 0, Neutrophils (%) 

(Auto) 85, Lymphocytes (%) (Auto) 12, Monocytes (%) (Auto) 3, Eosinophils (%) 

(Auto) 0, Basophils (%) (Auto) 0, Neutrophils # (Auto) 10.3, Lymphocytes # 

(Auto) 1.5, Monocytes # (Auto) 0.3, Eosinophils # (Auto) 0.0, Basophils # (Auto)

0.0, Immature Granulocyte # (Auto) 0.0, Sodium Level 139, Potassium Level 4.2, 

Chloride Level 102, Carbon Dioxide Level 20, Anion Gap 17, Blood Urea Nitrogen 

10, Creatinine 1.01, Estimat Glomerular Filtration Rate > 60, BUN/Creatinine 

Ratio 10, Glucose Level 117, Calcium Level 9.5, Corrected Calcium 9.6, Total 

Bilirubin 0.4, Aspartate Amino Transf (AST/SGOT) 29, Alanine Aminotransferase 

(ALT/SGPT) 46, Alkaline Phosphatase 93, Total Protein 7.6, Albumin 3.9


1/25/21 12:13: 


Blood Gas Puncture Site LR, Blood Gas Patient Temperature 36.4, Arterial Blood 

pH 7.42, Arterial Blood Partial Pressure CO2 36, Arterial Blood Partial Pressure

O2 64, Arterial Blood HCO3 23, Arterial Blood Total CO2 23.9, Arterial Blood 

Oxygen Saturation 93, Arterial Blood Base Excess -1.3, Brett Test YES-POS, Blood

Gas Ventilator Setting NO, Blood Gas Inspired Oxygen RA








Discharge


Home Medications:





Active Scripts


Active


Prednisone 10 Mg Tab.ds.pk 10 Mg PO DAILY


     Take 4 tabs(40mg)daily,decrease by 1 tab(10MG) every other day.


Advair Hfa 115-21 Mcg Inhaler (Fluticasone/Salmeterol) 12 Gm Hfa.aer.ad 0 Puff 

IH RTBID


Mirtazapine 15 Mg Tab.rapdis 15 Mg PO HS


Metoprolol Tartrate 50 Mg Tablet 100 Mg PO BID


Cefdinir 300 Mg Capsule 300 Mg PO BID





Instructions to patient/family


Please see electronic discharge instructions given to patient.





Diagnosis/Problems


Diagnosis/Problems





(1) Myopathy


(2) History of tracheostomy


(3) Frailty


(4) Cough


(5) Anemia


(6) COVID-19











THOR MATHEWS DO                Jan 27, 2021 06:14

## 2021-01-28 NOTE — THERAPY TEAM DISCHARGE SUMMARY
Therapy Discharge Summary


Discharge Recommendations


Date of Discharge


Jan 27, 2021 at 12:00





Occupational Therapy


Pt admitted to ARU with critical illness myopathy s/p COVID-19. At [facility], pt was 

independent with all I/ADLs and functional mobility without AD/AE, and he was 

working full time. Upon initial evaluation, pt required SBA with eating and oral

care, total assist showering (assist x2), max A upper body dressing, total 

assist lower body dressing, total assist footwear and max A toileting. OT txs 

focused on increasing BUE strength and activity tolerance, increasing BUE ROM, 

and increasing safety and independence with ADLs and functional mobility. Pt 

made good progress, meeting LTG of independent level with eating, oral care, 

upper body dressing, lower body dressing, footwear and toileting. Pt did not 

meet LTG of showering, as he required set up assistance. OT recommends shower 

chair for pt at discharge. Pt discharged from facility, d/c from OT at this 

time.


Decreased Activ Tolerance, Decreased UE Strength, Impaired Funct Balance, 

Restricted Funct UE ROM





PT Long Term Goals


Long Term Goals


PT Long Term Goals Time Frame:  Feb 3, 2021


Roll Left to Right (QC):  6


Sit to Lying (QC):  6


Lying-Sitting on Side/Bed(QC):  6


Sit to Stand (QC):  6


Chair/Bed-to-Chair Xfer(QC):  6


Car Transfer (QC):  5


Does the Patient Walk:  No and Walking Goal IS indicated


Walk 10 feet (QC):  6


Walk 10ft-Uneven Surface(QC):  6


Walk 50ft with 2 Turns (QC):  6


Walk 150 ft (QC):  6


Does the Pt use WC or Scooter?:  No


Wheel 50 feet with 2 turns (QC:  9


1 Step (curb) (QC):  6


4 Steps (QC):  5


12 Steps (QC):  5


Picking up an Object (QC):  6





OT Long Term Goals


Long Term Goals


Time Frame:  Feb 10, 2021


Eating (QC):  6 (met)


Oral Hygiene (QC):  6 (met)


Shower/Bathe Self (QC):  6 (not met, set up)


Upper Body Dressing (QC):  6 (met)


Lower Body Dressing (QC):  6 (met)


On/Off Footwear (QC):  6 (met)


Toileting Hygiene (QC):  6 (met)


Toilet/Commode Transfer (QC):  6 (met)


Additional Goals:  1-Demonstrate ADL Tasks, 2-Verbalize Understanding, 3-

ImproveStrength/[place]


1=Demonstrate adherence to instructed precautions during ADL tasks.


2=Patient will verbalize/demonstrate understanding of assistive 

devices/modifications for ADL.


3=Patient will improve strength/tolerance for activity to enable patient to 

perform ADL's.











GADIEL MAS OT          Jan 28, 2021 10:24